# Patient Record
Sex: FEMALE | Race: WHITE | Employment: FULL TIME | URBAN - NONMETROPOLITAN AREA
[De-identification: names, ages, dates, MRNs, and addresses within clinical notes are randomized per-mention and may not be internally consistent; named-entity substitution may affect disease eponyms.]

---

## 2019-01-17 ENCOUNTER — HOSPITAL ENCOUNTER (OUTPATIENT)
Age: 19
Discharge: HOME | End: 2019-01-17
Payer: COMMERCIAL

## 2019-01-17 ENCOUNTER — OFFICE VISIT (OUTPATIENT)
Dept: CARDIOLOGY CLINIC | Age: 19
End: 2019-01-17
Payer: COMMERCIAL

## 2019-01-17 VITALS
HEIGHT: 62 IN | BODY MASS INDEX: 23.6 KG/M2 | WEIGHT: 128.25 LBS | SYSTOLIC BLOOD PRESSURE: 118 MMHG | DIASTOLIC BLOOD PRESSURE: 69 MMHG | HEART RATE: 89 BPM

## 2019-01-17 DIAGNOSIS — R07.89 CHEST PAIN, ATYPICAL: ICD-10-CM

## 2019-01-17 DIAGNOSIS — R00.2 INTERMITTENT PALPITATIONS: Primary | ICD-10-CM

## 2019-01-17 DIAGNOSIS — R00.2 INTERMITTENT PALPITATIONS: ICD-10-CM

## 2019-01-17 DIAGNOSIS — R42 DIZZINESS: ICD-10-CM

## 2019-01-17 LAB
ANION GAP SERPL CALCULATED.3IONS-SCNC: 12 MEQ/L (ref 8–16)
BUN BLDV-MCNC: 10 MG/DL (ref 7–22)
CALCIUM SERPL-MCNC: 8.8 MG/DL (ref 8.5–10.5)
CHLORIDE BLD-SCNC: 102 MEQ/L (ref 98–111)
CO2: 24 MEQ/L (ref 23–33)
CREAT SERPL-MCNC: 0.5 MG/DL (ref 0.4–1.2)
ERYTHROCYTE [DISTWIDTH] IN BLOOD BY AUTOMATED COUNT: 14 % (ref 11.5–14.5)
ERYTHROCYTE [DISTWIDTH] IN BLOOD BY AUTOMATED COUNT: 48.1 FL (ref 35–45)
GFR SERPL CREATININE-BSD FRML MDRD: > 90 ML/MIN/1.73M2
GLUCOSE BLD-MCNC: 92 MG/DL (ref 70–108)
HCT VFR BLD CALC: 33.7 % (ref 37–47)
HEMOGLOBIN: 11 GM/DL (ref 12–16)
MAGNESIUM: 1.8 MG/DL (ref 1.6–2.4)
MCH RBC QN AUTO: 31.2 PG (ref 26–33)
MCHC RBC AUTO-ENTMCNC: 32.6 GM/DL (ref 32.2–35.5)
MCV RBC AUTO: 95.5 FL (ref 81–99)
PLATELET # BLD: 265 THOU/MM3 (ref 130–400)
PMV BLD AUTO: 9.4 FL (ref 9.4–12.4)
POTASSIUM SERPL-SCNC: 4 MEQ/L (ref 3.5–5.2)
RBC # BLD: 3.53 MILL/MM3 (ref 4.2–5.4)
SODIUM BLD-SCNC: 138 MEQ/L (ref 135–145)
T4 FREE: 1.03 NG/DL (ref 0.93–1.76)
TSH SERPL DL<=0.05 MIU/L-ACNC: 6.14 UIU/ML (ref 0.4–4.2)
WBC # BLD: 15.1 THOU/MM3 (ref 4.8–10.8)

## 2019-01-17 PROCEDURE — 84439 ASSAY OF FREE THYROXINE: CPT

## 2019-01-17 PROCEDURE — 85027 COMPLETE CBC AUTOMATED: CPT

## 2019-01-17 PROCEDURE — 84443 ASSAY THYROID STIM HORMONE: CPT

## 2019-01-17 PROCEDURE — 36415 COLL VENOUS BLD VENIPUNCTURE: CPT

## 2019-01-17 PROCEDURE — 80048 BASIC METABOLIC PNL TOTAL CA: CPT

## 2019-01-17 PROCEDURE — 99203 OFFICE O/P NEW LOW 30 MIN: CPT | Performed by: INTERNAL MEDICINE

## 2019-01-17 PROCEDURE — 83735 ASSAY OF MAGNESIUM: CPT

## 2019-01-17 PROCEDURE — 93000 ELECTROCARDIOGRAM COMPLETE: CPT | Performed by: INTERNAL MEDICINE

## 2019-01-21 ENCOUNTER — HOSPITAL ENCOUNTER (OUTPATIENT)
Dept: NON INVASIVE DIAGNOSTICS | Age: 19
Discharge: HOME | End: 2019-01-21
Payer: COMMERCIAL

## 2019-01-21 DIAGNOSIS — R00.2 INTERMITTENT PALPITATIONS: ICD-10-CM

## 2019-01-21 DIAGNOSIS — R07.89 CHEST PAIN, ATYPICAL: ICD-10-CM

## 2019-01-21 DIAGNOSIS — R42 DIZZINESS: ICD-10-CM

## 2019-01-21 LAB
LV EF: 65 %
LVEF MODALITY: NORMAL

## 2019-01-21 PROCEDURE — 93226 XTRNL ECG REC<48 HR SCAN A/R: CPT

## 2019-01-21 PROCEDURE — 93225 XTRNL ECG REC<48 HRS REC: CPT

## 2019-01-21 PROCEDURE — 93306 TTE W/DOPPLER COMPLETE: CPT

## 2019-01-25 LAB
ACQUISITION DURATION: NORMAL S
AVERAGE HEART RATE: 97 BPM
FASTEST SUPRAVENTRICULAR RATE: 85 BPM
HOOKUP DATE: NORMAL
HOOKUP TIME: NORMAL
LONGEST SUPRAVENTRICULAR RATE: 85 BPM
MAX HEART RATE TIME/DATE: NORMAL
MAX HEART RATE: 163 BPM
MIN HEART RATE TIME/DATE: NORMAL
MIN HEART RATE: 47 BPM
NUMBER OF FASTEST SUPRAVENTRICULAR BEATS: 6
NUMBER OF LONGEST SUPRAVENTRICULAR BEATS: 6
NUMBER OF QRS COMPLEXES: NORMAL
NUMBER OF SUPRAVENTRICULAR BEATS IN RUNS: 22
NUMBER OF SUPRAVENTRICULAR COUPLETS: 14
NUMBER OF SUPRAVENTRICULAR ECTOPICS: 60
NUMBER OF SUPRAVENTRICULAR ISOLATED BEATS: 10
NUMBER OF SUPRAVENTRICULAR RUNS: 5
NUMBER OF VENTRICULAR BEATS IN RUNS: 0
NUMBER OF VENTRICULAR BIGEMINAL CYCLES: 0
NUMBER OF VENTRICULAR COUPLETS: 0
NUMBER OF VENTRICULAR ECTOPICS: 0
NUMBER OF VENTRICULAR ISOLATED BEATS: 0
NUMBER OF VENTRICULAR RUNS: 0

## 2019-02-14 ENCOUNTER — OFFICE VISIT (OUTPATIENT)
Dept: CARDIOLOGY CLINIC | Age: 19
End: 2019-02-14
Payer: COMMERCIAL

## 2019-02-14 VITALS
DIASTOLIC BLOOD PRESSURE: 60 MMHG | HEART RATE: 100 BPM | SYSTOLIC BLOOD PRESSURE: 110 MMHG | WEIGHT: 139 LBS | HEIGHT: 62 IN | BODY MASS INDEX: 25.58 KG/M2

## 2019-02-14 DIAGNOSIS — R42 DIZZINESS: ICD-10-CM

## 2019-02-14 DIAGNOSIS — R00.2 INTERMITTENT PALPITATIONS: Primary | ICD-10-CM

## 2019-02-14 PROBLEM — R07.89 CHEST PAIN, ATYPICAL: Status: RESOLVED | Noted: 2019-01-17 | Resolved: 2019-02-14

## 2019-02-14 PROCEDURE — 99213 OFFICE O/P EST LOW 20 MIN: CPT | Performed by: INTERNAL MEDICINE

## 2019-04-11 ENCOUNTER — NURSE ONLY (OUTPATIENT)
Dept: LAB | Age: 19
End: 2019-04-11

## 2019-04-11 LAB
ALT SERPL-CCNC: 9 U/L (ref 11–66)
AST SERPL-CCNC: 19 U/L (ref 5–40)

## 2019-04-17 LAB — BILE ACIDS, FRACTIONATED & TOTAL: NORMAL

## 2019-05-07 ENCOUNTER — HOSPITAL ENCOUNTER (INPATIENT)
Age: 19
LOS: 3 days | Discharge: HOME | End: 2019-05-10
Attending: OBSTETRICS & GYNECOLOGY | Admitting: OBSTETRICS & GYNECOLOGY
Payer: COMMERCIAL

## 2019-05-07 LAB
ABO: NORMAL
AMPHETAMINE+METHAMPHETAMINE URINE SCREEN: NEGATIVE
ANTIBODY SCREEN: NORMAL
BARBITURATE QUANTITATIVE URINE: NEGATIVE
BASOPHILS # BLD: 0.3 %
BASOPHILS ABSOLUTE: 0 THOU/MM3 (ref 0–0.1)
BENZODIAZEPINE QUANTITATIVE URINE: NEGATIVE
CANNABINOID QUANTITATIVE URINE: NEGATIVE
COCAINE METABOLITE QUANTITATIVE URINE: NEGATIVE
EOSINOPHIL # BLD: 0.3 %
EOSINOPHILS ABSOLUTE: 0 THOU/MM3 (ref 0–0.4)
ERYTHROCYTE [DISTWIDTH] IN BLOOD BY AUTOMATED COUNT: 14 % (ref 11.5–14.5)
ERYTHROCYTE [DISTWIDTH] IN BLOOD BY AUTOMATED COUNT: 46.6 FL (ref 35–45)
HCT VFR BLD CALC: 34.3 % (ref 37–47)
HEMOGLOBIN: 11.3 GM/DL (ref 12–16)
IMMATURE GRANS (ABS): 0.17 THOU/MM3 (ref 0–0.07)
IMMATURE GRANULOCYTES: 1.2 %
LYMPHOCYTES # BLD: 16.2 %
LYMPHOCYTES ABSOLUTE: 2.4 THOU/MM3 (ref 1–4.8)
MCH RBC QN AUTO: 30.2 PG (ref 26–33)
MCHC RBC AUTO-ENTMCNC: 32.9 GM/DL (ref 32.2–35.5)
MCV RBC AUTO: 91.7 FL (ref 81–99)
MONOCYTES # BLD: 7.3 %
MONOCYTES ABSOLUTE: 1.1 THOU/MM3 (ref 0.4–1.3)
NUCLEATED RED BLOOD CELLS: 0 /100 WBC
OPIATES, URINE: NEGATIVE
OXYCODONE: NEGATIVE
PHENCYCLIDINE QUANTITATIVE URINE: NEGATIVE
PLATELET # BLD: 224 THOU/MM3 (ref 130–400)
PMV BLD AUTO: 10.3 FL (ref 9.4–12.4)
RBC # BLD: 3.74 MILL/MM3 (ref 4.2–5.4)
RH FACTOR: NORMAL
SEG NEUTROPHILS: 74.7 %
SEGMENTED NEUTROPHILS ABSOLUTE COUNT: 10.9 THOU/MM3 (ref 1.8–7.7)
WBC # BLD: 14.6 THOU/MM3 (ref 4.8–10.8)

## 2019-05-07 PROCEDURE — 86850 RBC ANTIBODY SCREEN: CPT

## 2019-05-07 PROCEDURE — 85025 COMPLETE CBC W/AUTO DIFF WBC: CPT

## 2019-05-07 PROCEDURE — 80307 DRUG TEST PRSMV CHEM ANLYZR: CPT

## 2019-05-07 PROCEDURE — 86592 SYPHILIS TEST NON-TREP QUAL: CPT

## 2019-05-07 PROCEDURE — 86900 BLOOD TYPING SEROLOGIC ABO: CPT

## 2019-05-07 PROCEDURE — 2709999900 HC NON-CHARGEABLE SUPPLY

## 2019-05-07 PROCEDURE — 1220000001 HC SEMI PRIVATE L&D R&B

## 2019-05-07 PROCEDURE — 86901 BLOOD TYPING SEROLOGIC RH(D): CPT

## 2019-05-07 PROCEDURE — 36415 COLL VENOUS BLD VENIPUNCTURE: CPT

## 2019-05-07 PROCEDURE — 2580000003 HC RX 258: Performed by: OBSTETRICS & GYNECOLOGY

## 2019-05-07 PROCEDURE — 6360000002 HC RX W HCPCS

## 2019-05-07 RX ORDER — DIPHENHYDRAMINE HYDROCHLORIDE 50 MG/ML
25 INJECTION INTRAMUSCULAR; INTRAVENOUS EVERY 4 HOURS PRN
Status: DISCONTINUED | OUTPATIENT
Start: 2019-05-07 | End: 2019-05-08 | Stop reason: HOSPADM

## 2019-05-07 RX ORDER — LIDOCAINE HYDROCHLORIDE 10 MG/ML
30 INJECTION, SOLUTION EPIDURAL; INFILTRATION; INTRACAUDAL; PERINEURAL PRN
Status: DISCONTINUED | OUTPATIENT
Start: 2019-05-07 | End: 2019-05-08 | Stop reason: HOSPADM

## 2019-05-07 RX ORDER — MORPHINE SULFATE 2 MG/ML
2 INJECTION, SOLUTION INTRAMUSCULAR; INTRAVENOUS
Status: DISCONTINUED | OUTPATIENT
Start: 2019-05-07 | End: 2019-05-08 | Stop reason: HOSPADM

## 2019-05-07 RX ORDER — MISOPROSTOL 200 UG/1
1000 TABLET ORAL PRN
Status: DISCONTINUED | OUTPATIENT
Start: 2019-05-07 | End: 2019-05-08 | Stop reason: HOSPADM

## 2019-05-07 RX ORDER — IBUPROFEN 800 MG/1
800 TABLET ORAL EVERY 8 HOURS PRN
Status: DISCONTINUED | OUTPATIENT
Start: 2019-05-07 | End: 2019-05-08 | Stop reason: HOSPADM

## 2019-05-07 RX ORDER — CARBOPROST TROMETHAMINE 250 UG/ML
250 INJECTION, SOLUTION INTRAMUSCULAR PRN
Status: DISCONTINUED | OUTPATIENT
Start: 2019-05-07 | End: 2019-05-08

## 2019-05-07 RX ORDER — MORPHINE SULFATE 4 MG/ML
4 INJECTION, SOLUTION INTRAMUSCULAR; INTRAVENOUS
Status: DISCONTINUED | OUTPATIENT
Start: 2019-05-07 | End: 2019-05-08 | Stop reason: HOSPADM

## 2019-05-07 RX ORDER — METHYLERGONOVINE MALEATE 0.2 MG/ML
200 INJECTION INTRAVENOUS PRN
Status: DISCONTINUED | OUTPATIENT
Start: 2019-05-07 | End: 2019-05-08 | Stop reason: HOSPADM

## 2019-05-07 RX ORDER — ONDANSETRON 2 MG/ML
8 INJECTION INTRAMUSCULAR; INTRAVENOUS EVERY 6 HOURS PRN
Status: DISCONTINUED | OUTPATIENT
Start: 2019-05-07 | End: 2019-05-08 | Stop reason: HOSPADM

## 2019-05-07 RX ORDER — BUTORPHANOL TARTRATE 1 MG/ML
1 INJECTION, SOLUTION INTRAMUSCULAR; INTRAVENOUS
Status: DISCONTINUED | OUTPATIENT
Start: 2019-05-07 | End: 2019-05-08 | Stop reason: HOSPADM

## 2019-05-07 RX ORDER — ACETAMINOPHEN 325 MG/1
650 TABLET ORAL EVERY 4 HOURS PRN
Status: DISCONTINUED | OUTPATIENT
Start: 2019-05-07 | End: 2019-05-08 | Stop reason: HOSPADM

## 2019-05-07 RX ORDER — SODIUM CHLORIDE, SODIUM LACTATE, POTASSIUM CHLORIDE, CALCIUM CHLORIDE 600; 310; 30; 20 MG/100ML; MG/100ML; MG/100ML; MG/100ML
INJECTION, SOLUTION INTRAVENOUS CONTINUOUS
Status: DISCONTINUED | OUTPATIENT
Start: 2019-05-07 | End: 2019-05-08

## 2019-05-07 RX ORDER — FERROUS SULFATE 325(65) MG
325 TABLET ORAL DAILY
Status: ON HOLD | COMMUNITY
End: 2019-05-10 | Stop reason: SDUPTHER

## 2019-05-07 RX ORDER — SEVOFLURANE 250 ML/250ML
1 LIQUID RESPIRATORY (INHALATION) CONTINUOUS PRN
Status: DISCONTINUED | OUTPATIENT
Start: 2019-05-07 | End: 2019-05-08 | Stop reason: HOSPADM

## 2019-05-07 RX ORDER — TERBUTALINE SULFATE 1 MG/ML
0.25 INJECTION, SOLUTION SUBCUTANEOUS ONCE
Status: DISCONTINUED | OUTPATIENT
Start: 2019-05-07 | End: 2019-05-08 | Stop reason: HOSPADM

## 2019-05-07 RX ADMIN — SODIUM CHLORIDE, POTASSIUM CHLORIDE, SODIUM LACTATE AND CALCIUM CHLORIDE: 600; 310; 30; 20 INJECTION, SOLUTION INTRAVENOUS at 19:20

## 2019-05-07 RX ADMIN — Medication 1 MILLI-UNITS/MIN: at 20:29

## 2019-05-07 ASSESSMENT — PAIN DESCRIPTION - DESCRIPTORS: DESCRIPTORS: CRAMPING

## 2019-05-07 NOTE — FLOWSHEET NOTE
Dr Camilo Signs notified of pt's arrival. States she'll be in in a couple of hours to place the lynch. Orders placed.

## 2019-05-07 NOTE — PLAN OF CARE
Problem: Anxiety:  Goal: Level of anxiety will decrease  Description  Level of anxiety will decrease  Outcome: Ongoing  Note:   Pt remains calm about the birthing experience, boyfriend at bedside, supportive. All questions/concerns addressed by RN. Problem: Breathing Pattern - Ineffective:  Goal: Able to breathe comfortably  Description  Able to breathe comfortably  Outcome: Ongoing  Note:   No signs of resp distress noted. Sp02 remains greater than 92% on room air. Respirations equal and unlabored. Problem: Fluid Volume - Imbalance:  Goal: Absence of intrapartum hemorrhage signs and symptoms  Description  Absence of intrapartum hemorrhage signs and symptoms  Outcome: Ongoing  Note:   No vaginal bleeding noted, will continue to monitor. Problem: Infection - Intrapartum Infection:  Goal: Will show no infection signs and symptoms  Description  Will show no infection signs and symptoms  Outcome: Ongoing  Note:   Vitals stable, pt remains afebrile. GBS negative. FHT's remain reassuring, will continue to monitor. Problem: Labor Process - Prolonged:  Goal: Uterine contractions within specified parameters  Description  Uterine contractions within specified parameters  Outcome: Ongoing  Note:   Pt having irregular, mild contractions. Problem: Pain - Acute:  Goal: Able to cope with pain  Description  Able to cope with pain  Outcome: Ongoing  Note:   Pt denies any pain at this time but is planning on an epidural as labor progresses. Pt decided on a pain goal of 7/10 during her stay. Problem: Tissue Perfusion - Uteroplacental, Altered:  Goal: Absence of abnormal fetal heart rate pattern  Description  Absence of abnormal fetal heart rate pattern  Outcome: Ongoing  Note:   Fetal Heart Tones remain reassuring. Continuous EFM in place.         Problem: Urinary Retention:  Goal: Urinary elimination within specified parameters  Description  Urinary elimination within specified parameters  Outcome:

## 2019-05-07 NOTE — PLAN OF CARE
Problem: Anxiety:  Goal: Level of anxiety will decrease  Description  Level of anxiety will decrease  5/7/2019 1954 by Itzel Hutchins RN  Outcome: Ongoing  Note:   Patient is slightly anxious and but cooperative, FOB at bedside at supportive, RN offering positive reassurance   5/7/2019 1921 by Victor Manuel Galeana RN  Outcome: Ongoing  Note:   Pt remains calm about the birthing experience, boyfriend at bedside, supportive. All questions/concerns addressed by RN. Problem: Breathing Pattern - Ineffective:  Goal: Able to breathe comfortably  Description  Able to breathe comfortably  5/7/2019 1954 by Itzel Hutchins RN  Outcome: Ongoing  Note:   Easy and unlabored respirations, denies SOB   5/7/2019 1921 by Victor Manuel Galeana RN  Outcome: Ongoing  Note:   No signs of resp distress noted. Sp02 remains greater than 92% on room air. Respirations equal and unlabored. Problem: Fluid Volume - Imbalance:  Goal: Absence of intrapartum hemorrhage signs and symptoms  Description  Absence of intrapartum hemorrhage signs and symptoms  5/7/2019 1954 by Itzel Hutchins RN  Outcome: Ongoing  Note:   No vaginal bleeding noted, will continue to monitor   5/7/2019 1921 by Victor Manuel Galeana RN  Outcome: Ongoing  Note:   No vaginal bleeding noted, will continue to monitor. Problem: Infection - Intrapartum Infection:  Goal: Will show no infection signs and symptoms  Description  Will show no infection signs and symptoms  5/7/2019 1954 by Itzel Hutchins RN  Outcome: Ongoing  Note:   Vitals are stable, afebrile, GBS negative   5/7/2019 1921 by Victor Manuel Galeana RN  Outcome: Ongoing  Note:   Vitals stable, pt remains afebrile. GBS negative. FHT's remain reassuring, will continue to monitor.        Problem: Labor Process - Prolonged:  Goal: Uterine contractions within specified parameters  Description  Uterine contractions within specified parameters  5/7/2019 1954 by Itzel Hutchins RN  Outcome: Ongoing  Note:   [place] monitor in place tracing contraction pattern   5/7/2019 1921 by Rafia Arnett RN  Outcome: Ongoing  Note:   Pt having irregular, mild contractions. Problem: Pain - Acute:  Goal: Able to cope with pain  Description  Able to cope with pain  5/7/2019 1954 by Shanika Felix RN  Outcome: Ongoing  Note:   Denies currently, planning on labor epidural, pain goal 7/10  5/7/2019 1921 by Rafia Arnett RN  Outcome: Ongoing  Note:   Pt denies any pain at this time but is planning on an epidural as labor progresses. Pt decided on a pain goal of 7/10 during her stay. Problem: Tissue Perfusion - Uteroplacental, Altered:  Goal: Absence of abnormal fetal heart rate pattern  Description  Absence of abnormal fetal heart rate pattern  5/7/2019 1954 by Shanika Felix RN  Outcome: Ongoing  Note:   External US in place tracing reactive FHTs   5/7/2019 1921 by Rafia Arnett RN  Outcome: Ongoing  Note:   Fetal Heart Tones remain reassuring. Continuous EFM in place. Problem: Urinary Retention:  Goal: Urinary elimination within specified parameters  Description  Urinary elimination within specified parameters  5/7/2019 1954 by Shanika Felix RN  Outcome: Ongoing  Note:   Patient voiding with ease, quantities sufficient   5/7/2019 1921 by Rafia Arnett RN  Outcome: Ongoing  Note:   Pt ambulating to bathroom as needed. Problem: Falls - Risk of:  Goal: Will remain free from falls  Description  Will remain free from falls  5/7/2019 1954 by Shanika Felix RN  Outcome: Ongoing  Note:   Aware to call out for assistance with ambulation, bed locked in lowest position, call light within reach   5/7/2019 1921 by Rafia Arnett RN  Outcome: Ongoing  Note:   Pt. Remains free from falls at this time. IV infusing per order. RN encouraged pt. To call for assistance to BR. Side rails up X2. Call light within reach. S.O. At bedside. RN will continue to provide for a safe environment.        Problem: Discharge Planning:  Goal: Discharged to appropriate level of care  Description  Discharged to appropriate level of care  5/7/2019 1954 by Anny Alejandra, RN  Outcome: Ongoing  Note:   Aware of 2 hour recovery period on L&D then transfer to mom/baby unit for remainder of stay   5/7/2019 1921 by Asia Yee RN  Outcome: Ongoing  Note:   Pt aware of 2hr recovery period in L&D and then transfer to mom/baby for the remainder of her stay. Care plan reviewed with patient and FOB.   Patient and FOB verbalize understanding of the plan of care and contribute to goal setting.  '

## 2019-05-08 ENCOUNTER — ANESTHESIA EVENT (OUTPATIENT)
Dept: LABOR AND DELIVERY | Age: 19
End: 2019-05-08
Payer: COMMERCIAL

## 2019-05-08 ENCOUNTER — ANESTHESIA (OUTPATIENT)
Dept: LABOR AND DELIVERY | Age: 19
End: 2019-05-08
Payer: COMMERCIAL

## 2019-05-08 LAB — RPR: NONREACTIVE

## 2019-05-08 PROCEDURE — 2580000003 HC RX 258: Performed by: OBSTETRICS & GYNECOLOGY

## 2019-05-08 PROCEDURE — 0KQM0ZZ REPAIR PERINEUM MUSCLE, OPEN APPROACH: ICD-10-PCS | Performed by: OBSTETRICS & GYNECOLOGY

## 2019-05-08 PROCEDURE — 0UQMXZZ REPAIR VULVA, EXTERNAL APPROACH: ICD-10-PCS | Performed by: OBSTETRICS & GYNECOLOGY

## 2019-05-08 PROCEDURE — 7200000001 HC VAGINAL DELIVERY

## 2019-05-08 PROCEDURE — 1220000000 HC SEMI PRIVATE OB R&B

## 2019-05-08 PROCEDURE — 10907ZC DRAINAGE OF AMNIOTIC FLUID, THERAPEUTIC FROM PRODUCTS OF CONCEPTION, VIA NATURAL OR ARTIFICIAL OPENING: ICD-10-PCS | Performed by: OBSTETRICS & GYNECOLOGY

## 2019-05-08 PROCEDURE — 6360000002 HC RX W HCPCS: Performed by: OBSTETRICS & GYNECOLOGY

## 2019-05-08 PROCEDURE — 2709999900 HC NON-CHARGEABLE SUPPLY

## 2019-05-08 PROCEDURE — 6370000000 HC RX 637 (ALT 250 FOR IP): Performed by: OBSTETRICS & GYNECOLOGY

## 2019-05-08 PROCEDURE — 3E033VJ INTRODUCTION OF OTHER HORMONE INTO PERIPHERAL VEIN, PERCUTANEOUS APPROACH: ICD-10-PCS | Performed by: OBSTETRICS & GYNECOLOGY

## 2019-05-08 PROCEDURE — 0UQGXZZ REPAIR VAGINA, EXTERNAL APPROACH: ICD-10-PCS | Performed by: OBSTETRICS & GYNECOLOGY

## 2019-05-08 PROCEDURE — 2500000003 HC RX 250 WO HCPCS: Performed by: ANESTHESIOLOGY

## 2019-05-08 PROCEDURE — 4A1HXCZ MONITORING OF PRODUCTS OF CONCEPTION, CARDIAC RATE, EXTERNAL APPROACH: ICD-10-PCS | Performed by: OBSTETRICS & GYNECOLOGY

## 2019-05-08 PROCEDURE — 3700000025 EPIDURAL BLOCK: Performed by: ANESTHESIOLOGY

## 2019-05-08 RX ORDER — MORPHINE SULFATE 4 MG/ML
4 INJECTION, SOLUTION INTRAMUSCULAR; INTRAVENOUS
Status: DISCONTINUED | OUTPATIENT
Start: 2019-05-08 | End: 2019-05-10 | Stop reason: HOSPADM

## 2019-05-08 RX ORDER — ONDANSETRON 2 MG/ML
4 INJECTION INTRAMUSCULAR; INTRAVENOUS EVERY 6 HOURS PRN
Status: DISCONTINUED | OUTPATIENT
Start: 2019-05-08 | End: 2019-05-10 | Stop reason: HOSPADM

## 2019-05-08 RX ORDER — METHYLERGONOVINE MALEATE 0.2 MG/ML
200 INJECTION INTRAVENOUS
Status: ACTIVE | OUTPATIENT
Start: 2019-05-08 | End: 2019-05-08

## 2019-05-08 RX ORDER — MORPHINE SULFATE 4 MG/ML
2 INJECTION, SOLUTION INTRAMUSCULAR; INTRAVENOUS
Status: DISCONTINUED | OUTPATIENT
Start: 2019-05-08 | End: 2019-05-10 | Stop reason: HOSPADM

## 2019-05-08 RX ORDER — LANOLIN 100 %
OINTMENT (GRAM) TOPICAL PRN
Status: DISCONTINUED | OUTPATIENT
Start: 2019-05-08 | End: 2019-05-10 | Stop reason: HOSPADM

## 2019-05-08 RX ORDER — HYDROCODONE BITARTRATE AND ACETAMINOPHEN 5; 325 MG/1; MG/1
2 TABLET ORAL EVERY 4 HOURS PRN
Status: DISCONTINUED | OUTPATIENT
Start: 2019-05-08 | End: 2019-05-10 | Stop reason: HOSPADM

## 2019-05-08 RX ORDER — HYDROCODONE BITARTRATE AND ACETAMINOPHEN 5; 325 MG/1; MG/1
1 TABLET ORAL EVERY 4 HOURS PRN
Status: DISCONTINUED | OUTPATIENT
Start: 2019-05-08 | End: 2019-05-10 | Stop reason: HOSPADM

## 2019-05-08 RX ORDER — ACETAMINOPHEN 325 MG/1
650 TABLET ORAL EVERY 4 HOURS PRN
Status: DISCONTINUED | OUTPATIENT
Start: 2019-05-08 | End: 2019-05-10 | Stop reason: HOSPADM

## 2019-05-08 RX ORDER — SODIUM CHLORIDE 0.9 % (FLUSH) 0.9 %
10 SYRINGE (ML) INJECTION EVERY 12 HOURS SCHEDULED
Status: DISCONTINUED | OUTPATIENT
Start: 2019-05-08 | End: 2019-05-10 | Stop reason: HOSPADM

## 2019-05-08 RX ORDER — DOCUSATE SODIUM 100 MG/1
100 CAPSULE, LIQUID FILLED ORAL 2 TIMES DAILY PRN
Status: DISCONTINUED | OUTPATIENT
Start: 2019-05-08 | End: 2019-05-10 | Stop reason: HOSPADM

## 2019-05-08 RX ORDER — SODIUM CHLORIDE, SODIUM LACTATE, POTASSIUM CHLORIDE, CALCIUM CHLORIDE 600; 310; 30; 20 MG/100ML; MG/100ML; MG/100ML; MG/100ML
INJECTION, SOLUTION INTRAVENOUS CONTINUOUS
Status: DISCONTINUED | OUTPATIENT
Start: 2019-05-08 | End: 2019-05-10 | Stop reason: HOSPADM

## 2019-05-08 RX ORDER — FERROUS SULFATE 325(65) MG
325 TABLET ORAL
Status: DISCONTINUED | OUTPATIENT
Start: 2019-05-09 | End: 2019-05-10 | Stop reason: HOSPADM

## 2019-05-08 RX ORDER — IBUPROFEN 800 MG/1
800 TABLET ORAL 3 TIMES DAILY
Status: DISCONTINUED | OUTPATIENT
Start: 2019-05-08 | End: 2019-05-10 | Stop reason: HOSPADM

## 2019-05-08 RX ORDER — CARBOPROST TROMETHAMINE 250 UG/ML
250 INJECTION, SOLUTION INTRAMUSCULAR
Status: DISPENSED | OUTPATIENT
Start: 2019-05-08 | End: 2019-05-08

## 2019-05-08 RX ORDER — MISOPROSTOL 200 UG/1
800 TABLET ORAL
Status: ACTIVE | OUTPATIENT
Start: 2019-05-08 | End: 2019-05-08

## 2019-05-08 RX ORDER — ONDANSETRON 4 MG/1
8 TABLET, FILM COATED ORAL EVERY 8 HOURS PRN
Status: DISCONTINUED | OUTPATIENT
Start: 2019-05-08 | End: 2019-05-10 | Stop reason: HOSPADM

## 2019-05-08 RX ORDER — EPHEDRINE SULFATE 50 MG/ML
5 INJECTION, SOLUTION INTRAVENOUS PRN
Status: DISCONTINUED | OUTPATIENT
Start: 2019-05-08 | End: 2019-05-08

## 2019-05-08 RX ORDER — SODIUM CHLORIDE 0.9 % (FLUSH) 0.9 %
10 SYRINGE (ML) INJECTION PRN
Status: DISCONTINUED | OUTPATIENT
Start: 2019-05-08 | End: 2019-05-10 | Stop reason: HOSPADM

## 2019-05-08 RX ADMIN — IBUPROFEN 800 MG: 800 TABLET ORAL at 20:41

## 2019-05-08 RX ADMIN — IBUPROFEN 800 MG: 800 TABLET ORAL at 12:35

## 2019-05-08 RX ADMIN — HYDROCODONE BITARTRATE AND ACETAMINOPHEN 1 TABLET: 5; 325 TABLET ORAL at 23:05

## 2019-05-08 RX ADMIN — SODIUM CHLORIDE, POTASSIUM CHLORIDE, SODIUM LACTATE AND CALCIUM CHLORIDE: 600; 310; 30; 20 INJECTION, SOLUTION INTRAVENOUS at 03:49

## 2019-05-08 RX ADMIN — DOCUSATE SODIUM 100 MG: 100 CAPSULE, LIQUID FILLED ORAL at 20:41

## 2019-05-08 RX ADMIN — Medication 18 ML/HR: at 00:45

## 2019-05-08 RX ADMIN — ONDANSETRON 8 MG: 2 INJECTION INTRAMUSCULAR; INTRAVENOUS at 08:15

## 2019-05-08 RX ADMIN — HYDROCODONE BITARTRATE AND ACETAMINOPHEN 2 TABLET: 5; 325 TABLET ORAL at 13:46

## 2019-05-08 RX ADMIN — HYDROCODONE BITARTRATE AND ACETAMINOPHEN 2 TABLET: 5; 325 TABLET ORAL at 18:10

## 2019-05-08 RX ADMIN — SODIUM CHLORIDE, POTASSIUM CHLORIDE, SODIUM LACTATE AND CALCIUM CHLORIDE: 600; 310; 30; 20 INJECTION, SOLUTION INTRAVENOUS at 00:17

## 2019-05-08 ASSESSMENT — PAIN SCALES - GENERAL
PAINLEVEL_OUTOF10: 4
PAINLEVEL_OUTOF10: 9
PAINLEVEL_OUTOF10: 7

## 2019-05-08 ASSESSMENT — PAIN DESCRIPTION - DESCRIPTORS: DESCRIPTORS: CRAMPING

## 2019-05-08 NOTE — FLOWSHEET NOTE
Dr Kyra Degroot returned page to unit, informed patient is requesting labor epidural. Platelets are 310. States he is en route.

## 2019-05-08 NOTE — FLOWSHEET NOTE
Tension applied to lynch,and re taped to leg. Patient tolerated well. Patient denies any additional needs at this time.

## 2019-05-08 NOTE — FLOWSHEET NOTE
Pt assisted to the bathroom. Voided large amount. Denice care done. Fundus firm and at umbilicus. Pt denies other needs at this time.

## 2019-05-08 NOTE — FLOWSHEET NOTE
Pt assisted to the bathroom. Voided large amount. Denice care done. Small amount of vaginal bleeding. Pt complained of being lightheaded arlet steady used to assist patient back to bed. Pt states feeling much better. Fundus firm and at umbilicus.

## 2019-05-08 NOTE — H&P
[address] [address]  History and Physical Update    Pt Name: [place]  MRN: [id number]  [place]: 2000  Date of evaluation: 5/7/2019    [] I have examined the patient and reviewed the H&P/Consult and there are no changes to the patient or plans. [x] I have examined the patient and reviewed the H&P/Consult and have noted the following changes: 18 yo G1 @ 39+3 wks presents for IOL. She had decreased fetal movement. GBS neg. Sve: 1/70/-3 lynch bulb placed with 60 cc of sterile saline. Risks, benefits and alternatives of lynch induction of labor reviewed and patient agrees. FHTs:  130, mod faby, +accels, no decels toco: occasional category I tracing. Reactive.       Heather Leigh MD  Electronically signed 5/7/2019 at 8:20 PM

## 2019-05-08 NOTE — ANESTHESIA PROCEDURE NOTES
Epidural Block    Patient location during procedure: OB  Reason for block: labor epidural  Staffing  Anesthesiologist: Jacinto Sher DO  Performed: anesthesiologist   Preanesthetic Checklist  Completed: patient identified, site marked, surgical consent, pre-op evaluation, timeout performed, IV checked, risks and benefits discussed, monitors and equipment checked, anesthesia consent given, oxygen available and patient being monitored  Epidural  Patient position: sitting  Prep: ChloraPrep and site prepped and draped  Patient monitoring: continuous pulse ox and frequent blood pressure checks  Approach: midline  Location: lumbar (1-5)  Injection technique: DAVEY saline  Provider prep: mask and sterile gloves  Needle  Needle type: Tuohy   Needle gauge: 18 G  Needle length: 3.5 in  Needle insertion depth: 5.5 cm  Catheter type: side hole  Catheter at skin depth: 12 cm  Test dose: negative  Assessment  Hemodynamics: stable  Attempts: 1

## 2019-05-08 NOTE — FLOWSHEET NOTE
Pt assisted to the bathroom. Pt voided large amount. Denice care done. Small amount of rubra lochia draining. Fundus firm and at umbilicus. Pt denies other needs at this time.

## 2019-05-08 NOTE — PROGRESS NOTES
S: comfortable with epidurla    O:  Vitals:    05/08/19 0800   BP: (!) 98/55   Pulse: (!) 47   Resp:    Temp:    SpO2:      Sve: 4-5/70/-3, AROM for clear fluid    FHTs: 120, mod faby, +accels, no decels  [place]: q2-3 min. Category I tracing. Reactive. A: 18 yo G1 @ 39+3 wks IOL  P:  1. con't pitocin  2. Internals prn. 3. con't position changes  4. con't to work towards delivery.      Shadi Leigh  8:12 AM  5/8/2019

## 2019-05-08 NOTE — L&D DELIVERY NOTE
Department of Obstetrics and Gynecology   Vaginal Delivery Note at [facility]      Date of Delivery:  2019    Procedure:  Spontaneous vaginal delivery and Repair second degree perineal, right labia, left vaginal wall and left labial spontaneous lacerations    Surgeon:  Niya Leigh    Anesthesia:  epidural anesthesia    Estimated blood loss:  800ml    Cord blood sent Yes    Complications:  Post partum hemorrhage due to lacerations    Condition:  infant stable to general nursery and mother stable    Details of Procedure: The patient is a 19 y.o.  female at 43w3d  who was admitted for IOL. She received the following interventions: ARBOW, IV Pitocin induction and lynch bulb cervical ripening. The patient progressed well,did receive an epidural, became complete and started to push. Patient progressed well and the fetal head was delivered over an intact perineum, nose and mouth suctioned with bulb suction and the rest of the infant delivered atraumatically, placed on mother abdomen. The cord was clamped and cut and infant handed off to the waiting nurse for evaluation. The delivery of the placenta was spontaneous. The perineum and vagina were explored and a Repair second degree perineal, right labia, left vaginal wall and left labial spontaneous lacerations were repaired in standard fashion 3-0 vicryl. A vaginal sweep was completed and five sharps were placed in the proper container. Sponge counts correct.     Infant Wt: pending    APGARS:  pending  Fernando Childress [[id number]]             Electronically signed by Te Gonsalves MD on 2019 at 11:30 AM

## 2019-05-08 NOTE — PLAN OF CARE
Problem: Anxiety:  Goal: Level of anxiety will decrease  Description  Level of anxiety will decrease  5/8/2019 0848 by Jayda Marquez RN  Note:   Pt denies anxiety  5/7/2019 1954 by Gil Can RN  Outcome: Ongoing  Note:   Patient is slightly anxious and but cooperative, FOB at bedside at supportive, RN offering positive reassurance   5/7/2019 1921 by Nas Boone RN  Outcome: Ongoing  Note:   Pt remains calm about the birthing experience, boyfriend at bedside, supportive. All questions/concerns addressed by RN. Problem: Breathing Pattern - Ineffective:  Goal: Able to breathe comfortably  Description  Able to breathe comfortably  5/8/2019 0848 by Jayda Marquez RN  Note:   RR 18 clear and easy  5/7/2019 1954 by Gil Can RN  Outcome: Ongoing  Note:   Easy and unlabored respirations, denies SOB   5/7/2019 1921 by Nas Boone RN  Outcome: Ongoing  Note:   No signs of resp distress noted. Sp02 remains greater than 92% on room air. Respirations equal and unlabored. Problem: Fluid Volume - Imbalance:  Goal: Absence of intrapartum hemorrhage signs and symptoms  Description  Absence of intrapartum hemorrhage signs and symptoms  5/8/2019 0848 by Jayda Marquez RN  Note:   No vaginal bleeding noted  5/7/2019 1954 by Gil Can RN  Outcome: Ongoing  Note:   No vaginal bleeding noted, will continue to monitor   5/7/2019 1921 by Nas Boone RN  Outcome: Ongoing  Note:   No vaginal bleeding noted, will continue to monitor. Problem: Infection - Intrapartum Infection:  Goal: Will show no infection signs and symptoms  Description  Will show no infection signs and symptoms  5/8/2019 0848 by Jayda Marquez RN  Note:   Pt afebrile   5/7/2019 1954 by Gil Can RN  Outcome: Ongoing  Note:   Vitals are stable, afebrile, GBS negative   5/7/2019 1921 by Nas Boone RN  Outcome: Ongoing  Note:   Vitals stable, pt remains afebrile. GBS negative.  FHT's remain reassuring, will continue to monitor. Problem: Labor Process - Prolonged:  Goal: Uterine contractions within specified parameters  Description  Uterine contractions within specified parameters  5/8/2019 0848 by Henna De La Garza RN  Note:   Pitocin titrated to ctx every 2-3 minutes  5/7/2019 1954 by Adrienne Abdi RN  Outcome: Ongoing  Note:   [place] monitor in place tracing contraction pattern   5/7/2019 1921 by Sandra Thomas RN  Outcome: Ongoing  Note:   Pt having irregular, mild contractions. Problem: Pain - Acute:  Goal: Able to cope with pain  Description  Able to cope with pain  5/8/2019 0848 by Henna De La Garza RN  Note:   Pt comfortable with epidural  5/7/2019 1954 by Adrienne Abdi RN  Outcome: Ongoing  Note:   Denies currently, planning on labor epidural, pain goal 7/10  5/7/2019 1921 by Sandra Thomas RN  Outcome: Ongoing  Note:   Pt denies any pain at this time but is planning on an epidural as labor progresses. Pt decided on a pain goal of 7/10 during her stay. Problem: Tissue Perfusion - Uteroplacental, Altered:  Goal: Absence of abnormal fetal heart rate pattern  Description  Absence of abnormal fetal heart rate pattern  5/8/2019 0848 by Henna De La Garza RN  Note:   FHT's reactive  5/7/2019 1954 by Adrienne Abdi RN  Outcome: Ongoing  Note:   External US in place tracing reactive FHTs   5/7/2019 1921 by Sandra Thomas RN  Outcome: Ongoing  Note:   Fetal Heart Tones remain reassuring. Continuous EFM in place. Problem: Urinary Retention:  Goal: Urinary elimination within specified parameters  Description  Urinary elimination within specified parameters  5/8/2019 0848 by Henna De La Garza RN  Note:   [place] in place and draining clear urine  5/7/2019 1954 by Adrienne Abdi RN  Outcome: Ongoing  Note:   Patient voiding with ease, quantities sufficient   5/7/2019 1921 by Sandra Thomas RN  Outcome: Ongoing  Note:   Pt ambulating to bathroom as needed.       Problem: Falls - Risk of:  Goal: Will remain free from falls  Description  Will remain free from falls  5/8/2019 0848 by Kiara Pearson RN  Note:   Pt aware not to get out of bed without assistance  5/7/2019 1954 by Ryan Luna RN  Outcome: Ongoing  Note:   Aware to call out for assistance with ambulation, bed locked in lowest position, call light within reach   5/7/2019 1921 by Noe Feng RN  Outcome: Ongoing  Note:   Pt. Remains free from falls at this time. IV infusing per order. RN encouraged pt. To call for assistance to BR. Side rails up X2. Call light within reach. S.O. At bedside. RN will continue to provide for a safe environment. Problem: Discharge Planning:  Goal: Discharged to appropriate level of care  Description  Discharged to appropriate level of care  5/8/2019 0848 by Kiara Pearson RN  Note:   Pt plans discharge to home after delivery  5/7/2019 1954 by Ryan Luna RN  Outcome: Ongoing  Note:   Aware of 2 hour recovery period on L&D then transfer to mom/baby unit for remainder of stay   5/7/2019 1921 by Noe Feng RN  Outcome: Ongoing  Note:   Pt aware of 2hr recovery period in L&D and then transfer to mom/baby for the remainder of her stay. Care plan reviewed with patient. Patient verbalize understanding of the plan of care and contribute to goal setting.

## 2019-05-08 NOTE — FLOWSHEET NOTE
Dr Lj Robert returned page to unit, updated patients cris came out at [phone]. Received an epidural at 0055 and has been comfortable all night. FHTs reactive, contractions every 2-3 minutes, Pitocin infusing at 4mu/min. VE 6/90/-1 with intact membranes. Continue with current plan of care.

## 2019-05-08 NOTE — ANESTHESIA PRE PROCEDURE
Department of Anesthesiology  Preprocedure Note       Name:  Chuy Fountain   Age:  19 y.o.  :  2000                                          MRN:  [id number]         Date:  2019      Surgeon: * No surgeons listed *    Procedure: Labor Analgesia    Medications prior to admission:   Prior to Admission medications    Medication Sig Start Date End Date Taking?  Authorizing Provider   ferrous sulfate 325 (65 Fe) MG tablet Take 325 mg by mouth daily   Yes Historical Provider, MD   Prenatal Multivit-Min-Fe-FA (PRENATAL VITAMINS PO) Take by mouth daily   Yes Historical Provider, MD       Current medications:    Current Facility-Administered Medications   Medication Dose Route Frequency Provider Last Rate Last Dose    ePHEDrine injection 5 mg  5 mg Intravenous PRN Díaz Light, DO        fentaNYL 750 mcg, ropivacaine 0.1% in sodium chloride 0.9% 265mL (OB) epidural  18 mL/hr Epidural Continuous Díaz Light, DO 18 mL/hr at 19 0045 18 mL/hr at 19 0045    lactated ringers infusion   Intravenous Continuous Mayito Edd Leigh  mL/hr at 19 0017      lidocaine PF 1 % injection 30 mL  30 mL Other PRN Bailey Milligan MD        butorphanol (STADOL) injection 1 mg  1 mg Intravenous Q1H PRN Bailey Milligan MD        nitrous oxide 50% inhalation 1 each  1 each Inhalation Continuous PRN Bailey Milligan MD        acetaminophen (TYLENOL) tablet 650 mg  650 mg Oral Q4H PRN Bailey Milligan MD        ibuprofen (ADVIL;MOTRIN) tablet 800 mg  800 mg Oral Q8H PRN Bailey Milligan MD        morphine (PF) injection 2 mg  2 mg Intravenous Q2H PRN Bailey Milligan MD        Or    morphine injection 4 mg  4 mg Intravenous Q2H PRN Bailey Milligan MD        oxytocin (PITOCIN) 30 units in 500 mL infusion  1 avelino-units/min Intravenous Continuous Bailey Milligan MD 1 mL/hr at 19 1 avelino-units/min at 19    diphenhydrAMINE (BENADRYL) injection 25 mg  25 mg Intravenous Q4H PRN Shamar Caldwell MD        ondansetron [facility]) injection 8 mg  8 mg Intravenous Q6H PRN Shamar Caldwell MD        terbutaline (BRETHINE) injection 0.25 mg  0.25 mg Subcutaneous Once Shamar Caldwell MD        oxytocin (PITOCIN) 30 units in 500 mL infusion  1 avelino-units/min Intravenous Continuous PRN Shamar Caldwell MD        methylergonovine (METHERGINE) injection 200 mcg  200 mcg Intramuscular PRN Shamar Caldwell MD        carboprost (HEMABATE) injection 250 mcg  250 mcg Intramuscular PRN Shamar Caldwell MD        misoprostol (CYTOTEC) tablet 1,000 mcg  1,000 mcg Rectal PRN Thyra Shivers, MD        witch hazel-glycerin (TUCKS) pad   Topical PRN Shamar Caldwell MD        benzocaine-menthol (DERMOPLAST) 20-0.5 % spray   Topical PRN Shamar Caldwell MD           Allergies:  No Known Allergies    Problem List:    Patient Active Problem List   Diagnosis Code    Intermittent palpitations R00.2    Dizziness on standing R42       Past Medical History:        Diagnosis Date    Anemia 2019    taking extra iron during pregnancy    Generalized pruritus 2019    labs normal during pregnancy    Thyroid disease 2019    pt states her labs were high but she didn't follow up as she was supposed to       Past Surgical History:  History reviewed. No pertinent surgical history. Social History:    Social History     Tobacco Use    Smoking status: Never Smoker    Smokeless tobacco: Never Used   Substance Use Topics    Alcohol use:  No                                Counseling given: Not Answered      Vital Signs (Current):   Vitals:    05/08/19 0000 05/08/19 0030 05/08/19 0045 05/08/19 0050   BP: 135/79 139/77 (!) 142/87 139/67   Pulse: 88 103 118 75   Resp:       Temp:       TempSrc:       Weight:       Height:                                                  BP Readings from Last 3 Encounters:   05/08/19 139/67   02/14/19 110/60   01/17/19 118/69       NPO Status: Time of last liquid consumption: 1730                        Time of last solid consumption: 1730                        Date of last liquid consumption: 05/07/19                        Date of last solid food consumption: 05/07/19    BMI:   Wt Readings from Last 3 Encounters:   05/07/19 146 lb (66.2 kg)   02/14/19 139 lb (63 kg)   01/17/19 128 lb 4 oz (58.2 kg)     Body mass index is 26.7 kg/m². CBC:   Lab Results   Component Value Date    WBC 14.6 05/07/2019    RBC 3.74 05/07/2019    HGB 11.3 05/07/2019    HCT 34.3 05/07/2019    MCV 91.7 05/07/2019     05/07/2019       CMP:   Lab Results   Component Value Date     01/17/2019    K 4.0 01/17/2019     01/17/2019    CO2 24 01/17/2019    BUN 10 01/17/2019    CREATININE 0.5 01/17/2019    LABGLOM >90 01/17/2019    GLUCOSE 92 01/17/2019    CALCIUM 8.8 01/17/2019    AST 19 04/11/2019    ALT 9 04/11/2019       POC Tests: No results for input(s): POCGLU, POCNA, POCK, POCCL, POCBUN, POCHEMO, POCHCT in the last 72 hours. Coags: No results found for: PROTIME, INR, APTT    HCG (If Applicable): No results found for: PREGTESTUR, PREGSERUM, HCG, HCGQUANT     ABGs: No results found for: PHART, PO2ART, WBC0XXR, NOH5YMA, BEART, W9OGLZQJ     Type & Screen (If Applicable):  Lab Results   Component Value Date    LABRH POS 05/07/2019       Anesthesia Evaluation  Patient summary reviewed  Airway: Mallampati: III  TM distance: >3 FB   Neck ROM: full  Mouth opening: > = 3 FB Dental:          Pulmonary:                              Cardiovascular:                      Neuro/Psych:               GI/Hepatic/Renal:             Endo/Other:                     Abdominal:           Vascular:                                        Anesthesia Plan      epidural     ASA 2             Anesthetic plan and risks discussed with patient. Dexter Carrasco  [address] [place] [address]   5/8/2019

## 2019-05-08 NOTE — FLOWSHEET NOTE
Tension applied to cervical lynch, patient tolerated well. Encouraged slow easy breathes during contractions. Patient denies any additional needs at this time.

## 2019-05-08 NOTE — PLAN OF CARE
Problem: Fluid Volume - Imbalance:  Goal: Absence of postpartum hemorrhage signs and symptoms  Description  Absence of postpartum hemorrhage signs and symptoms  Outcome: Ongoing  Note:   Pt has small amount of rubra lochia draining     Problem: Pain:  Goal: Pain level will decrease  Description  Pain level will decrease  Outcome: Ongoing  Note:   Pt states pain medication working     Problem: Pain:  Goal: Control of acute pain  Description  Control of acute pain  Outcome: Ongoing  Note:   Pt states pain medication working     Problem: Pain:  Goal: Control of chronic pain  Description  Control of chronic pain  Outcome: Ongoing  Note:   Pt states pain medication working     Problem: Discharge Planning:  Goal: Discharged to appropriate level of care  Description  Discharged to appropriate level of care  Outcome: Ongoing  Note:   Pt to go home on day 2     Problem: Constipation:  Goal: Bowel elimination is within specified parameters  Description  Bowel elimination is within specified parameters  Outcome: Ongoing  Note:   Pt states drinking water to promote bowel elimination     Problem: Mood - Altered:  Goal: Mood stable  Description  Mood stable  Outcome: Ongoing  Note:   Pt calm and happy  Care plan reviewed with patient. Patient verbalize understanding of the plan of care and contribute to goal setting.

## 2019-05-09 LAB
ERYTHROCYTE [DISTWIDTH] IN BLOOD BY AUTOMATED COUNT: 14.1 % (ref 11.5–14.5)
ERYTHROCYTE [DISTWIDTH] IN BLOOD BY AUTOMATED COUNT: 48.3 FL (ref 35–45)
HCT VFR BLD CALC: 23.5 % (ref 37–47)
HEMOGLOBIN: 7.5 GM/DL (ref 12–16)
MCH RBC QN AUTO: 30.2 PG (ref 26–33)
MCHC RBC AUTO-ENTMCNC: 31.9 GM/DL (ref 32.2–35.5)
MCV RBC AUTO: 94.8 FL (ref 81–99)
PLATELET # BLD: 157 THOU/MM3 (ref 130–400)
PMV BLD AUTO: 10 FL (ref 9.4–12.4)
RBC # BLD: 2.48 MILL/MM3 (ref 4.2–5.4)
WBC # BLD: 16.9 THOU/MM3 (ref 4.8–10.8)

## 2019-05-09 PROCEDURE — 6370000000 HC RX 637 (ALT 250 FOR IP): Performed by: OBSTETRICS & GYNECOLOGY

## 2019-05-09 PROCEDURE — 1220000000 HC SEMI PRIVATE OB R&B

## 2019-05-09 PROCEDURE — 85027 COMPLETE CBC AUTOMATED: CPT

## 2019-05-09 PROCEDURE — 36415 COLL VENOUS BLD VENIPUNCTURE: CPT

## 2019-05-09 PROCEDURE — 2709999900 HC NON-CHARGEABLE SUPPLY

## 2019-05-09 RX ADMIN — HYDROCODONE BITARTRATE AND ACETAMINOPHEN 1 TABLET: 5; 325 TABLET ORAL at 10:32

## 2019-05-09 RX ADMIN — IBUPROFEN 800 MG: 800 TABLET ORAL at 15:17

## 2019-05-09 RX ADMIN — FERROUS SULFATE TAB 325 MG (65 MG ELEMENTAL FE) 325 MG: 325 (65 FE) TAB at 08:12

## 2019-05-09 RX ADMIN — HYDROCODONE BITARTRATE AND ACETAMINOPHEN 1 TABLET: 5; 325 TABLET ORAL at 05:38

## 2019-05-09 RX ADMIN — IBUPROFEN 800 MG: 800 TABLET ORAL at 06:32

## 2019-05-09 RX ADMIN — DOCUSATE SODIUM 100 MG: 100 CAPSULE, LIQUID FILLED ORAL at 08:12

## 2019-05-09 RX ADMIN — HYDROCODONE BITARTRATE AND ACETAMINOPHEN 1 TABLET: 5; 325 TABLET ORAL at 20:50

## 2019-05-09 RX ADMIN — DOCUSATE SODIUM 100 MG: 100 CAPSULE, LIQUID FILLED ORAL at 20:50

## 2019-05-09 RX ADMIN — HYDROCODONE BITARTRATE AND ACETAMINOPHEN 1 TABLET: 5; 325 TABLET ORAL at 15:17

## 2019-05-09 ASSESSMENT — PAIN SCALES - GENERAL
PAINLEVEL_OUTOF10: 3
PAINLEVEL_OUTOF10: 3
PAINLEVEL_OUTOF10: 5

## 2019-05-09 NOTE — LACTATION NOTE
Assisted Pt with latch. Infant was shallow. Redness noted on left nipple. Assisted Pt in getting a deeper latch. Pt states that feels more comfortable when infant is deeper after the first few seconds. Encouraged Pt to call out for assistance as needed if she is in pain and can't get a comfortable latch. Provided lanolin and breast pads. Will follow up PRN.

## 2019-05-09 NOTE — PLAN OF CARE
Problem: Fluid Volume - Imbalance:  Goal: Absence of postpartum hemorrhage signs and symptoms  Description  Absence of postpartum hemorrhage signs and symptoms  5/9/2019 0210 by Salena Baker RN  Note:   Small amount of lochia     Problem: Pain:  Goal: Pain level will decrease  Description  Pain level will decrease  5/9/2019 0210 by Salena Baker RN  Outcome: Ongoing  Note:   Pain controlled with oral pain medication     Problem: Pain:  Goal: Control of acute pain  Description  Control of acute pain  5/9/2019 0210 by Salena Baker RN  Outcome: Ongoing  Note:   Using oral pain medications, ice to perineum, heat for cramping     Problem: Discharge Planning:  Goal: Discharged to appropriate level of care  Description  Discharged to appropriate level of care  5/9/2019 0210 by Salena Baker RN  Note:   Discharge not anticipated today     Problem: Constipation:  Goal: Bowel elimination is within specified parameters  Description  Bowel elimination is within specified parameters  5/9/2019 0210 by Salena Baker RN  Note:   Taking stool softeners     Problem: Mood - Altered:  Goal: Mood stable  Description  Mood stable  5/9/2019 0210 by Salena Baker RN  Note:   Pleasant and cooperative   Care plan reviewed with patient and SO. Patient and SO verbalize understanding of the plan of care and contribute to goal setting.

## 2019-05-09 NOTE — ANESTHESIA POSTPROCEDURE EVALUATION
Department of Anesthesiology  Postprocedure Note    Patient: Christoph Bueno  MRN: [id number]  YOB: 2000  Date of evaluation: 5/9/2019  Time:  8:16 AM     Procedure Summary     Date:  05/08/19 Room / Location:      Anesthesia Start:  6606 Anesthesia Stop:  6052    Procedure:  Labor Analgesia Diagnosis:      Scheduled Providers:   Responsible Provider:  Linus Beauchamp DO    Anesthesia Type:  epidural ASA Status:  2          Anesthesia Type: No value filed. Jaz Phase I: Jaz Score: 9    Jaz Phase II: Jaz Score: 10    Last vitals: Reviewed and per EMR flowsheets. Anesthesia Post Evaluation    Patient location during evaluation: floor  Patient participation: complete - patient participated  Level of consciousness: awake and alert  Airway patency: patent  Nausea & Vomiting: no nausea and no vomiting  Complications: no  Cardiovascular status: hemodynamically stable  Respiratory status: acceptable and spontaneous ventilation  Hydration status: euvolemic  Comments: Patient voices no complaints or concerns at this time.

## 2019-05-09 NOTE — PROGRESS NOTES
Department of Obstetrics and Gynecology  Progress Note      S: doing well. No complaints. Lochia appropriate. Denies cp, sob, ct.       O:   Vitals:    19 0800   BP: 113/62   Pulse: 83   Resp: 17   Temp: 98.2 °F (36.8 °C)   SpO2:        Gen: no acute distress, up ambulating in room   Resp: breathing unlabored     Lab Results   Component Value Date    WBC 16.9 (H) 2019    HGB 7.5 (L) 2019    HCT 23.5 (L) 2019    MCV 94.8 2019     2019       A: 19 y.o.   PPD#1 s/p , acute post op anemia from pp hemorrhage due to laceration-asymptomatic, doing well. P:   1. A POS   2. Con't postpartum care-con't iron  3.  Anticipate d/c home tomorrow    Alex Urbina  12:29 PM  2019

## 2019-05-10 VITALS
WEIGHT: 146 LBS | OXYGEN SATURATION: 98 % | RESPIRATION RATE: 16 BRPM | HEIGHT: 62 IN | HEART RATE: 87 BPM | SYSTOLIC BLOOD PRESSURE: 116 MMHG | TEMPERATURE: 98.4 F | BODY MASS INDEX: 26.87 KG/M2 | DIASTOLIC BLOOD PRESSURE: 56 MMHG

## 2019-05-10 PROCEDURE — 6370000000 HC RX 637 (ALT 250 FOR IP): Performed by: OBSTETRICS & GYNECOLOGY

## 2019-05-10 PROCEDURE — 90471 IMMUNIZATION ADMIN: CPT | Performed by: OBSTETRICS & GYNECOLOGY

## 2019-05-10 PROCEDURE — 2709999900 HC NON-CHARGEABLE SUPPLY

## 2019-05-10 PROCEDURE — 90715 TDAP VACCINE 7 YRS/> IM: CPT | Performed by: OBSTETRICS & GYNECOLOGY

## 2019-05-10 PROCEDURE — 6360000002 HC RX W HCPCS: Performed by: OBSTETRICS & GYNECOLOGY

## 2019-05-10 RX ORDER — FERROUS SULFATE 325(65) MG
325 TABLET ORAL DAILY
Qty: 30 TABLET | Refills: 3 | Status: SHIPPED | OUTPATIENT
Start: 2019-05-10

## 2019-05-10 RX ORDER — PSEUDOEPHEDRINE HCL 30 MG
100 TABLET ORAL 2 TIMES DAILY PRN
COMMUNITY
Start: 2019-05-10

## 2019-05-10 RX ORDER — HYDROCODONE BITARTRATE AND ACETAMINOPHEN 5; 325 MG/1; MG/1
1 TABLET ORAL EVERY 6 HOURS PRN
Qty: 15 TABLET | Refills: 0 | Status: SHIPPED | OUTPATIENT
Start: 2019-05-10 | End: 2019-05-13

## 2019-05-10 RX ORDER — IBUPROFEN 600 MG/1
600 TABLET ORAL EVERY 6 HOURS PRN
Qty: 30 TABLET | Refills: 1 | COMMUNITY
Start: 2019-05-10

## 2019-05-10 RX ADMIN — HYDROCODONE BITARTRATE AND ACETAMINOPHEN 1 TABLET: 5; 325 TABLET ORAL at 07:38

## 2019-05-10 RX ADMIN — HYDROCODONE BITARTRATE AND ACETAMINOPHEN 1 TABLET: 5; 325 TABLET ORAL at 02:35

## 2019-05-10 RX ADMIN — HYDROCODONE BITARTRATE AND ACETAMINOPHEN 1 TABLET: 5; 325 TABLET ORAL at 11:55

## 2019-05-10 RX ADMIN — IBUPROFEN 800 MG: 800 TABLET ORAL at 11:55

## 2019-05-10 RX ADMIN — TETANUS TOXOID, REDUCED DIPHTHERIA TOXOID AND ACELLULAR PERTUSSIS VACCINE, ADSORBED 0.5 ML: 5; 2.5; 8; 8; 2.5 SUSPENSION INTRAMUSCULAR at 11:18

## 2019-05-10 RX ADMIN — DOCUSATE SODIUM 100 MG: 100 CAPSULE, LIQUID FILLED ORAL at 07:38

## 2019-05-10 RX ADMIN — FERROUS SULFATE TAB 325 MG (65 MG ELEMENTAL FE) 325 MG: 325 (65 FE) TAB at 07:38

## 2019-05-10 RX ADMIN — IBUPROFEN 800 MG: 800 TABLET ORAL at 02:35

## 2019-05-10 NOTE — FLOWSHEET NOTE
Discharge teaching and instructions completed with patient using teachback method. AVS reviewed. Printed prescriptions given to patient. Patient voiced understanding regarding prescriptions, follow up appointments, and care of self at home.

## 2019-05-10 NOTE — LACTATION NOTE
Pt. Stated she has no questions or concerns at this time. Encouraged pt. To make an appointment with out pt. Support group. Will follow up with pt. PRN.

## 2019-05-10 NOTE — PROGRESS NOTES
Department of Obstetrics and Gynecology  Progress Note      S: doing well. No complaints. Lochia appropriate. Denies cp, sob, ct.       O:   Vitals:    05/10/19 0742   BP: (!) 116/56   Pulse: 87   Resp: 16   Temp: 98.4 °F (36.9 °C)   SpO2:        Gen: no acute distress, up ambulating in room   Resp: breathing unlabored   Ext genitalia: b/l labial swelling, no induration    Lab Results   Component Value Date    WBC 16.9 (H) 2019    HGB 7.5 (L) 2019    HCT 23.5 (L) 2019    MCV 94.8 2019     2019       A: 19 y.o.   PPD#2 s/p , acute post op anemia from pp hemorrhage due to laceration-asymptomatic, doing well. P:   1. A POS   2. Con't postpartum care-con't iron  3.  Anticipate d/c home today-f/u in 6 wks for [facility] care    Sandra Leigh  8:30 AM  5/10/2019

## 2019-05-10 NOTE — PLAN OF CARE
Problem: Fluid Volume - Imbalance:  Goal: Absence of postpartum hemorrhage signs and symptoms  Description  Absence of postpartum hemorrhage signs and symptoms  5/10/2019 0138 by Nathan Stuart RN  Outcome: Ongoing  Note:   Small amount of lochia     Problem: Pain:  Goal: Pain level will decrease  Description  Pain level will decrease  5/10/2019 0138 by Nathan Stuart RN  Outcome: Ongoing  Note:   States relief with oral pain medication and ice packs     Problem: Pain:  Goal: Control of acute pain  Description  Control of acute pain  5/10/2019 0138 by Nathan Stuart RN  Outcome: Ongoing  Note:   Using oral pain medications and ice packs     Problem: Discharge Planning:  Goal: Discharged to appropriate level of care  Description  Discharged to appropriate level of care  5/10/2019 0138 by Nathan Stuart RN  Outcome: Ongoing  Note:   Plan is for discharge home today     Problem: Constipation:  Goal: Bowel elimination is within specified parameters  Description  Bowel elimination is within specified parameters  5/10/2019 0138 by Nathan Stuart RN  Outcome: Ongoing  Note:   Taking stool softeners     Problem: Mood - Altered:  Goal: Mood stable  Description  Mood stable  5/10/2019 0138 by Nathan Stuart RN  Outcome: Ongoing  Note:   Pleasant and cooperative   Care plan reviewed with patient and SO. Patient and SO verbalize understanding of the plan of care and contribute to goal setting.

## 2019-05-14 ENCOUNTER — HOSPITAL ENCOUNTER (INPATIENT)
Age: 19
LOS: 3 days | Discharge: HOME | DRG: 776 | End: 2019-05-17
Attending: OBSTETRICS & GYNECOLOGY | Admitting: OBSTETRICS & GYNECOLOGY
Payer: COMMERCIAL

## 2019-05-14 PROBLEM — R10.2 PELVIC PAIN: Status: ACTIVE | Noted: 2019-05-14

## 2019-05-14 PROBLEM — N71.9 ENDOMETRITIS: Status: ACTIVE | Noted: 2019-05-14

## 2019-05-14 LAB
ALBUMIN SERPL-MCNC: 3.5 G/DL (ref 3.5–5.1)
ALP BLD-CCNC: 123 U/L (ref 38–126)
ALT SERPL-CCNC: 18 U/L (ref 11–66)
ANION GAP SERPL CALCULATED.3IONS-SCNC: 15 MEQ/L (ref 8–16)
AST SERPL-CCNC: 22 U/L (ref 5–40)
BACTERIA: ABNORMAL
BASOPHILS # BLD: 0 %
BASOPHILS ABSOLUTE: 0 THOU/MM3 (ref 0–0.1)
BILIRUB SERPL-MCNC: 0.2 MG/DL (ref 0.3–1.2)
BILIRUBIN URINE: NEGATIVE
BLOOD, URINE: ABNORMAL
BUN BLDV-MCNC: 11 MG/DL (ref 7–22)
CALCIUM SERPL-MCNC: 8.5 MG/DL (ref 8.5–10.5)
CASTS: ABNORMAL /LPF
CASTS: ABNORMAL /LPF
CHARACTER, URINE: CLEAR
CHLORIDE BLD-SCNC: 100 MEQ/L (ref 98–111)
CO2: 21 MEQ/L (ref 23–33)
COLOR: YELLOW
CREAT SERPL-MCNC: 0.6 MG/DL (ref 0.4–1.2)
CRYSTALS: ABNORMAL
EOSINOPHIL # BLD: 0 %
EOSINOPHILS ABSOLUTE: 0 THOU/MM3 (ref 0–0.4)
EPITHELIAL CELLS, UA: ABNORMAL /HPF
ERYTHROCYTE [DISTWIDTH] IN BLOOD BY AUTOMATED COUNT: 14.1 % (ref 11.5–14.5)
ERYTHROCYTE [DISTWIDTH] IN BLOOD BY AUTOMATED COUNT: 47.5 FL (ref 35–45)
GFR SERPL CREATININE-BSD FRML MDRD: > 90 ML/MIN/1.73M2
GLUCOSE BLD-MCNC: 114 MG/DL (ref 70–108)
GLUCOSE, URINE: NEGATIVE MG/DL
HCT VFR BLD CALC: 28.1 % (ref 37–47)
HEMOGLOBIN: 9.3 GM/DL (ref 12–16)
IMMATURE GRANS (ABS): 0.91 THOU/MM3 (ref 0–0.07)
IMMATURE GRANULOCYTES: 5.1 %
KETONES, URINE: NEGATIVE
LACTIC ACID, SEPSIS: 0.9 MMOL/L (ref 0.5–1.9)
LACTIC ACID: 0.9 MMOL/L (ref 0.5–2.2)
LEUKOCYTE ESTERASE, URINE: ABNORMAL
LYMPHOCYTES # BLD: 7 %
LYMPHOCYTES ABSOLUTE: 1.2 THOU/MM3 (ref 1–4.8)
MCH RBC QN AUTO: 30.6 PG (ref 26–33)
MCHC RBC AUTO-ENTMCNC: 33.1 GM/DL (ref 32.2–35.5)
MCV RBC AUTO: 92.4 FL (ref 81–99)
MISCELLANEOUS LAB TEST RESULT: ABNORMAL
MONOCYTES # BLD: 5 %
MONOCYTES ABSOLUTE: 0.9 THOU/MM3 (ref 0.4–1.3)
NITRITE, URINE: NEGATIVE
NUCLEATED RED BLOOD CELLS: 1 /100 WBC
PH UA: 7 (ref 5–9)
PLATELET # BLD: 264 THOU/MM3 (ref 130–400)
PMV BLD AUTO: 9.1 FL (ref 9.4–12.4)
POTASSIUM REFLEX MAGNESIUM: 3.9 MEQ/L (ref 3.5–5.2)
PROCALCITONIN: 0.1 NG/ML (ref 0.01–0.09)
PROTEIN UA: NEGATIVE MG/DL
RBC # BLD: 3.04 MILL/MM3 (ref 4.2–5.4)
RBC URINE: ABNORMAL /HPF
RENAL EPITHELIAL, UA: ABNORMAL
SCAN OF BLOOD SMEAR: NORMAL
SEG NEUTROPHILS: 83 %
SEGMENTED NEUTROPHILS ABSOLUTE COUNT: 14.8 THOU/MM3 (ref 1.8–7.7)
SODIUM BLD-SCNC: 136 MEQ/L (ref 135–145)
SPECIFIC GRAVITY UA: 1.02 (ref 1–1.03)
TOTAL PROTEIN: 7.5 G/DL (ref 6.1–8)
UROBILINOGEN, URINE: 1 EU/DL (ref 0–1)
WBC # BLD: 17.8 THOU/MM3 (ref 4.8–10.8)
WBC UA: ABNORMAL /HPF
YEAST: ABNORMAL

## 2019-05-14 PROCEDURE — 2709999900 HC NON-CHARGEABLE SUPPLY

## 2019-05-14 PROCEDURE — 2580000003 HC RX 258: Performed by: OBSTETRICS & GYNECOLOGY

## 2019-05-14 PROCEDURE — 36415 COLL VENOUS BLD VENIPUNCTURE: CPT

## 2019-05-14 PROCEDURE — 83605 ASSAY OF LACTIC ACID: CPT

## 2019-05-14 PROCEDURE — 87086 URINE CULTURE/COLONY COUNT: CPT

## 2019-05-14 PROCEDURE — 87186 SC STD MICRODIL/AGAR DIL: CPT

## 2019-05-14 PROCEDURE — 87184 SC STD DISK METHOD PER PLATE: CPT

## 2019-05-14 PROCEDURE — 6370000000 HC RX 637 (ALT 250 FOR IP): Performed by: OBSTETRICS & GYNECOLOGY

## 2019-05-14 PROCEDURE — 80053 COMPREHEN METABOLIC PANEL: CPT

## 2019-05-14 PROCEDURE — 85025 COMPLETE CBC W/AUTO DIFF WBC: CPT

## 2019-05-14 PROCEDURE — 87077 CULTURE AEROBIC IDENTIFY: CPT

## 2019-05-14 PROCEDURE — 84145 PROCALCITONIN (PCT): CPT

## 2019-05-14 PROCEDURE — 81001 URINALYSIS AUTO W/SCOPE: CPT

## 2019-05-14 PROCEDURE — 87040 BLOOD CULTURE FOR BACTERIA: CPT

## 2019-05-14 PROCEDURE — 1200000000 HC SEMI PRIVATE

## 2019-05-14 PROCEDURE — 6360000002 HC RX W HCPCS: Performed by: OBSTETRICS & GYNECOLOGY

## 2019-05-14 RX ORDER — SODIUM CHLORIDE, SODIUM LACTATE, POTASSIUM CHLORIDE, AND CALCIUM CHLORIDE .6; .31; .03; .02 G/100ML; G/100ML; G/100ML; G/100ML
30 INJECTION, SOLUTION INTRAVENOUS ONCE
Status: COMPLETED | OUTPATIENT
Start: 2019-05-14 | End: 2019-05-14

## 2019-05-14 RX ORDER — FAMOTIDINE 20 MG/1
20 TABLET, FILM COATED ORAL 2 TIMES DAILY
Status: DISCONTINUED | OUTPATIENT
Start: 2019-05-14 | End: 2019-05-17 | Stop reason: HOSPADM

## 2019-05-14 RX ORDER — HYDROCODONE BITARTRATE AND ACETAMINOPHEN 5; 325 MG/1; MG/1
1 TABLET ORAL EVERY 6 HOURS PRN
COMMUNITY

## 2019-05-14 RX ORDER — SODIUM CHLORIDE 0.9 % (FLUSH) 0.9 %
10 SYRINGE (ML) INJECTION PRN
Status: DISCONTINUED | OUTPATIENT
Start: 2019-05-14 | End: 2019-05-17 | Stop reason: HOSPADM

## 2019-05-14 RX ORDER — SODIUM CHLORIDE 0.9 % (FLUSH) 0.9 %
10 SYRINGE (ML) INJECTION EVERY 12 HOURS SCHEDULED
Status: DISCONTINUED | OUTPATIENT
Start: 2019-05-14 | End: 2019-05-17 | Stop reason: HOSPADM

## 2019-05-14 RX ORDER — ZOLPIDEM TARTRATE 5 MG/1
5 TABLET ORAL NIGHTLY PRN
Status: DISCONTINUED | OUTPATIENT
Start: 2019-05-14 | End: 2019-05-17 | Stop reason: HOSPADM

## 2019-05-14 RX ORDER — ACETAMINOPHEN 325 MG/1
650 TABLET ORAL EVERY 4 HOURS PRN
Status: DISCONTINUED | OUTPATIENT
Start: 2019-05-14 | End: 2019-05-17 | Stop reason: HOSPADM

## 2019-05-14 RX ORDER — DOCUSATE SODIUM 100 MG/1
100 CAPSULE, LIQUID FILLED ORAL 2 TIMES DAILY PRN
Status: DISCONTINUED | OUTPATIENT
Start: 2019-05-14 | End: 2019-05-17 | Stop reason: HOSPADM

## 2019-05-14 RX ORDER — IBUPROFEN 600 MG/1
600 TABLET ORAL EVERY 6 HOURS PRN
Status: DISCONTINUED | OUTPATIENT
Start: 2019-05-14 | End: 2019-05-17 | Stop reason: HOSPADM

## 2019-05-14 RX ADMIN — SODIUM CHLORIDE, POTASSIUM CHLORIDE, SODIUM LACTATE AND CALCIUM CHLORIDE 1824 ML: 600; 310; 30; 20 INJECTION, SOLUTION INTRAVENOUS at 15:53

## 2019-05-14 RX ADMIN — IBUPROFEN 600 MG: 600 TABLET ORAL at 18:47

## 2019-05-14 RX ADMIN — ZOLPIDEM TARTRATE 5 MG: 5 TABLET ORAL at 21:29

## 2019-05-14 RX ADMIN — FAMOTIDINE 20 MG: 20 TABLET ORAL at 21:29

## 2019-05-14 RX ADMIN — PIPERACILLIN AND TAZOBACTAM 3.38 G: 3; .375 INJECTION, POWDER, FOR SOLUTION INTRAVENOUS at 17:54

## 2019-05-14 RX ADMIN — ACETAMINOPHEN 650 MG: 325 TABLET ORAL at 15:11

## 2019-05-14 ASSESSMENT — PAIN DESCRIPTION - LOCATION: LOCATION: PERINEUM

## 2019-05-14 ASSESSMENT — PAIN DESCRIPTION - PAIN TYPE: TYPE: ACUTE PAIN

## 2019-05-14 ASSESSMENT — PAIN SCALES - GENERAL
PAINLEVEL_OUTOF10: 9
PAINLEVEL_OUTOF10: 9
PAINLEVEL_OUTOF10: 4

## 2019-05-14 NOTE — PROGRESS NOTES
MEWS score 6 Dr. Seema Lopez here to see pt and assess her new labs Lactic Acid were being drawn medication changed to Motrin to help with fever pt remains in stable condition.

## 2019-05-14 NOTE — FLOWSHEET NOTE
05/14/19 1745   Provider Notification   Reason for Communication Review case   Provider Name Ed Rodriguez   Provider Notification Physician   Method of Communication Secure Message   Response Waiting for response   Notification Time 1745     temp is 103.0 -- that is 2 hours post Tylenol.  I will apply ice packs, but can we also do Motrin

## 2019-05-14 NOTE — PROGRESS NOTES
Patient arrived to 10E48 by wheelchair. Patient fatigued, nauseated and chilling. Tempt orally 102.4. C/o burning with urination. Voided clear yellow urine. No blood noted on oz pad. Denies emesis today. Oriented to room and call system. Bed alarm turned on. Call light within reach.

## 2019-05-15 LAB
ANION GAP SERPL CALCULATED.3IONS-SCNC: 15 MEQ/L (ref 8–16)
BASOPHILS # BLD: 0.5 %
BASOPHILS ABSOLUTE: 0.1 THOU/MM3 (ref 0–0.1)
BUN BLDV-MCNC: 11 MG/DL (ref 7–22)
CALCIUM SERPL-MCNC: 8.3 MG/DL (ref 8.5–10.5)
CHLORIDE BLD-SCNC: 108 MEQ/L (ref 98–111)
CO2: 20 MEQ/L (ref 23–33)
CREAT SERPL-MCNC: 0.6 MG/DL (ref 0.4–1.2)
EOSINOPHIL # BLD: 0.6 %
EOSINOPHILS ABSOLUTE: 0.1 THOU/MM3 (ref 0–0.4)
ERYTHROCYTE [DISTWIDTH] IN BLOOD BY AUTOMATED COUNT: 14.2 % (ref 11.5–14.5)
ERYTHROCYTE [DISTWIDTH] IN BLOOD BY AUTOMATED COUNT: 49.9 FL (ref 35–45)
GFR SERPL CREATININE-BSD FRML MDRD: > 90 ML/MIN/1.73M2
GLUCOSE BLD-MCNC: 110 MG/DL (ref 70–108)
HCT VFR BLD CALC: 26.2 % (ref 37–47)
HEMOGLOBIN: 8.2 GM/DL (ref 12–16)
IMMATURE GRANS (ABS): 0.76 THOU/MM3 (ref 0–0.07)
IMMATURE GRANULOCYTES: 5.9 %
LYMPHOCYTES # BLD: 10.3 %
LYMPHOCYTES ABSOLUTE: 1.3 THOU/MM3 (ref 1–4.8)
MCH RBC QN AUTO: 29.6 PG (ref 26–33)
MCHC RBC AUTO-ENTMCNC: 31.3 GM/DL (ref 32.2–35.5)
MCV RBC AUTO: 94.6 FL (ref 81–99)
MONOCYTES # BLD: 8.3 %
MONOCYTES ABSOLUTE: 1.1 THOU/MM3 (ref 0.4–1.3)
NUCLEATED RED BLOOD CELLS: 0 /100 WBC
PLATELET # BLD: 247 THOU/MM3 (ref 130–400)
PLATELET ESTIMATE: ADEQUATE
PMV BLD AUTO: 9 FL (ref 9.4–12.4)
POTASSIUM SERPL-SCNC: 3.6 MEQ/L (ref 3.5–5.2)
RBC # BLD: 2.77 MILL/MM3 (ref 4.2–5.4)
SCAN OF BLOOD SMEAR: NORMAL
SEG NEUTROPHILS: 74.4 %
SEGMENTED NEUTROPHILS ABSOLUTE COUNT: 9.6 THOU/MM3 (ref 1.8–7.7)
SODIUM BLD-SCNC: 143 MEQ/L (ref 135–145)
WBC # BLD: 12.9 THOU/MM3 (ref 4.8–10.8)

## 2019-05-15 PROCEDURE — 36415 COLL VENOUS BLD VENIPUNCTURE: CPT

## 2019-05-15 PROCEDURE — 6360000002 HC RX W HCPCS: Performed by: OBSTETRICS & GYNECOLOGY

## 2019-05-15 PROCEDURE — 85025 COMPLETE CBC W/AUTO DIFF WBC: CPT

## 2019-05-15 PROCEDURE — 1200000000 HC SEMI PRIVATE

## 2019-05-15 PROCEDURE — 2709999900 HC NON-CHARGEABLE SUPPLY

## 2019-05-15 PROCEDURE — 80048 BASIC METABOLIC PNL TOTAL CA: CPT

## 2019-05-15 PROCEDURE — 6370000000 HC RX 637 (ALT 250 FOR IP): Performed by: OBSTETRICS & GYNECOLOGY

## 2019-05-15 PROCEDURE — 2580000003 HC RX 258: Performed by: OBSTETRICS & GYNECOLOGY

## 2019-05-15 RX ADMIN — FAMOTIDINE 20 MG: 20 TABLET ORAL at 20:08

## 2019-05-15 RX ADMIN — IBUPROFEN 600 MG: 600 TABLET ORAL at 12:47

## 2019-05-15 RX ADMIN — FAMOTIDINE 20 MG: 20 TABLET ORAL at 09:21

## 2019-05-15 RX ADMIN — IBUPROFEN 600 MG: 600 TABLET ORAL at 06:10

## 2019-05-15 RX ADMIN — PIPERACILLIN AND TAZOBACTAM 3.38 G: 3; .375 INJECTION, POWDER, FOR SOLUTION INTRAVENOUS at 00:10

## 2019-05-15 RX ADMIN — IBUPROFEN 600 MG: 600 TABLET ORAL at 00:10

## 2019-05-15 RX ADMIN — PIPERACILLIN AND TAZOBACTAM 3.38 G: 3; .375 INJECTION, POWDER, FOR SOLUTION INTRAVENOUS at 08:10

## 2019-05-15 RX ADMIN — ACETAMINOPHEN 650 MG: 325 TABLET ORAL at 21:40

## 2019-05-15 RX ADMIN — DOCUSATE SODIUM 100 MG: 100 CAPSULE, LIQUID FILLED ORAL at 13:02

## 2019-05-15 RX ADMIN — ZOLPIDEM TARTRATE 5 MG: 5 TABLET ORAL at 22:59

## 2019-05-15 RX ADMIN — ACETAMINOPHEN 650 MG: 325 TABLET ORAL at 14:35

## 2019-05-15 RX ADMIN — Medication 10 ML: at 08:10

## 2019-05-15 RX ADMIN — PIPERACILLIN AND TAZOBACTAM 3.38 G: 3; .375 INJECTION, POWDER, FOR SOLUTION INTRAVENOUS at 16:22

## 2019-05-15 RX ADMIN — Medication 10 ML: at 13:02

## 2019-05-15 RX ADMIN — IBUPROFEN 600 MG: 600 TABLET ORAL at 18:59

## 2019-05-15 ASSESSMENT — PAIN DESCRIPTION - PAIN TYPE
TYPE: ACUTE PAIN
TYPE: SURGICAL PAIN

## 2019-05-15 ASSESSMENT — PAIN SCALES - GENERAL
PAINLEVEL_OUTOF10: 0
PAINLEVEL_OUTOF10: 5
PAINLEVEL_OUTOF10: 3
PAINLEVEL_OUTOF10: 6
PAINLEVEL_OUTOF10: 8
PAINLEVEL_OUTOF10: 4
PAINLEVEL_OUTOF10: 4
PAINLEVEL_OUTOF10: 0
PAINLEVEL_OUTOF10: 0
PAINLEVEL_OUTOF10: 5

## 2019-05-15 ASSESSMENT — PAIN DESCRIPTION - PROGRESSION: CLINICAL_PROGRESSION: GRADUALLY IMPROVING

## 2019-05-15 ASSESSMENT — PAIN DESCRIPTION - LOCATION
LOCATION: ABDOMEN
LOCATION: PERINEUM

## 2019-05-15 ASSESSMENT — PAIN DESCRIPTION - ONSET: ONSET: GRADUAL

## 2019-05-15 ASSESSMENT — PAIN DESCRIPTION - FREQUENCY: FREQUENCY: INTERMITTENT

## 2019-05-15 ASSESSMENT — PAIN DESCRIPTION - DESCRIPTORS: DESCRIPTORS: ACHING;DISCOMFORT

## 2019-05-15 NOTE — PROGRESS NOTES
Pt states she id not feeling well. C/o 8/10 general body aches. Dr Olmstead Qualerica through prefect serve to notify.

## 2019-05-15 NOTE — LACTATION NOTE
Followed up with Patient. Discussed pump settings with Patient. Assisted Patient in pumping. Patient decided she would like to latch infant. Encouraged her to give EBM to infant after she latches. Encouraged Patient to supplement with formula after EBM if infant is still hungry. Encouraged Patient to keep latching or pumping every three hours if she would like to continues to breastfeed. RN notified of Patient plan. Will follow up PRN.

## 2019-05-15 NOTE — PLAN OF CARE
Problem: Pain:  Goal: Pain level will decrease  Description  Pain level will decrease  5/15/2019 1847 by Stefanie Zuniga RN  Outcome: Ongoing  Note:   Pt takeing tylenol and motrin ot help with pain control and and generalized body aches     Problem: GI  Goal: No bowel complications  Outcome: Ongoing  Note:   Pt has 1 bm today     Problem:   Goal: Adequate urinary output  5/15/2019 1847 by Stefanie Zuniga RN  Outcome: Ongoing  Note:   Pt voiding well, but still c/o pain with urination      Problem: Discharge Planning  Intervention: Interaction with patient/family and care team  5/15/2019 1847 by Stefanie Zuniga RN  Note:   Pt plans on going home with help from family  5/15/2019 0908 by Stefanie Zuniga RN  Note:   Pt plans on going home with help from family     Care plan reviewed with patient. Patient verbalize understanding of the plan of care and contribute to goal setting.

## 2019-05-15 NOTE — PLAN OF CARE
Problem: Pain:  Goal: Pain level will decrease  Description  Pain level will decrease  5/15/2019 0908 by Stefanie Zuniga RN  Outcome: Ongoing  Note:   Pt taking motrin for pain control     Problem:   Goal: Adequate urinary output  5/15/2019 0908 by Stefanie Zuniga RN  Outcome: Ongoing  Note:   Pt voiding well. Pt still c/o burning when voiding     Problem: Discharge Planning  Intervention: Interaction with patient/family and care team  Note:   Pt plans on going home with help from family    Care plan reviewed with patient. Patient  verbalize understanding of the plan of care and contribute to goal setting.

## 2019-05-15 NOTE — CARE COORDINATION
5/15/19, 10:31 AM      Lewis Johnson       [facility]   Location: [address] [place] [address] Reason for admit: Pelvic pain [R10.2]  Endometritis [N71.9] Status: IP  Admit order signed?: yes  PMH:  has a past medical history of Anemia, Generalized pruritus, and Thyroid disease. Procedure: none  Pertinent abnormal Imaging:none  Medications:  Scheduled Meds:   sodium chloride flush  10 mL Intravenous 2 times per day    famotidine  20 mg Oral BID    piperacillin-tazobactam  3.375 g Intravenous Q8H    enoxaparin  40 mg Subcutaneous Q24H     Continuous Infusions:   Pertinent Info/Orders/Treatment Plan:  OBGYN following. Post vaginal delivery on 5/10/19. Admitted for pelvic pain. WBC 12.9. IV zosyn. Ibuprofen. Diet: DIET GENERAL;   Smoking status:  reports that she has never smoked. She has never used smokeless tobacco.   PCP: Leanna Strickland MD  Readmission: Patient has been readmitted within 4 days. Patient went to f/u appointment? yes  If yes, was it within 7 days? yes  Patient was able to fill prescriptions? yes  Patient is taking medications as prescribed? yes  Cause for readmission? Pelvic pain post vaginal delivery on 5/10/19    Readmission Risk Score: 6%    Discharge Planning  Current Residence:  Private Residence  Living Arrangements:  Spouse/Significant Other   Support Systems:  Family Members, Parent, Spouse/Significant Other  Current Services PTA:     Potential Assistance Needed:  N/A  Potential Assistance Purchasing Medications:  No  Does patient want to participate in local refill/ meds to beds program?     Type of Home Care Services:  None  Patient expects to be discharged to:     Expected Discharge date: Follow Up Appointment: Best Day/ Time:      Discharge Plan: Spoke with patient, plans to return home with infant and family. Denies discharge needs.       Evaluation: no

## 2019-05-15 NOTE — PLAN OF CARE
Problem: Pain:  Goal: Pain level will decrease  Description  Pain level will decrease  Outcome: Met This Shift  Note:   Pt is taking oral pain medication with relief     Problem:   Goal: Adequate urinary output  Note:   Pt is voiding sufficient amounts

## 2019-05-15 NOTE — H&P
[address] [place] [address]                              HISTORY AND PHYSICAL    PATIENT NAME: Elan Torres                      :        2000  MED REC NO:   [id number]                           ROOM:       [id number]  ACCOUNT NO:   [de-identified]                           ADMIT DATE: 2019  PROVIDER:     Erick Osullivan. Seema Lopez M.D.    [facility]:  Dysuria and pain. b HISTORY OF PRESENT ILLNESS:  The patient had onset of dysuria, body  aches, and pain starting , 2019. She had a baby on  2019 and went home on 05/10/2019 and presented back now today with  body aches, fever, chills, and pelvic pain. She tried Tylenol at home,  but presented to the office with the above complaints. Recent history, the patient delivered a 6 pound 13 ounce baby on  2019 and had a significant laceration at that time, which was  repaired. She was _____ well enough to go home on 05/10/2019 and then  re-presented today. PAST MEDICAL HISTORY:  None. PAST SURGICAL HISTORY:  None. ALLERGIES:  None. MEDICATIONS:  Iron, Norco, and prenatal vitamins. SOCIAL HISTORY:  The patient does not use alcohol, tobacco, or illegal  drugs. FAMILY HISTORY:  Noncontributory. REVIEW OF SYSTEMS:  Negative, except as above. She does endorse dysuria  and back pain. PHYSICAL EXAMINATION:  GENERAL:  Pale white female, in no acute distress, but clearly feeling  poorly. HEENT:  Benign. LUNGS:  Clear to auscultation. HEART:  Tachycardic, but regular rhythm. ABDOMEN:  Soft. Upper abdomen, nontender. Lower abdomen, tender  throughout, but especially suprapubic. EXTREMITIES:  No clubbing, cyanosis, or edema. GENITALIA:  External female genitalia with a small bruise, but no  induration and no erythema of the episiotomy site. LABORATORY DATA:  White count of 17, lactic acid 0.9. Urinalysis,  greater than 100 whites.   Blood cultures pending. Procalcitonin  pending. ASSESSMENT:  Endometritis versus pyelonephritis. PLAN:  IV Zosyn, antipyretics, and followup on cultures.         Getachew Corey M.D.    D: 05/14/2019 20:05:45       T: 05/14/2019 22:41:25     [email or web address]  Job#: [id number]     Doc#: [id number]    CC:

## 2019-05-16 LAB
ATYPICAL LYMPHOCYTES: ABNORMAL %
BASOPHILIA: ABNORMAL
BASOPHILS # BLD: 0.6 %
BASOPHILS ABSOLUTE: 0.1 THOU/MM3 (ref 0–0.1)
EOSINOPHIL # BLD: 2.4 %
EOSINOPHILS ABSOLUTE: 0.2 THOU/MM3 (ref 0–0.4)
ERYTHROCYTE [DISTWIDTH] IN BLOOD BY AUTOMATED COUNT: 14.2 % (ref 11.5–14.5)
ERYTHROCYTE [DISTWIDTH] IN BLOOD BY AUTOMATED COUNT: 49.2 FL (ref 35–45)
HCT VFR BLD CALC: 25.6 % (ref 37–47)
HEMOGLOBIN: 8 GM/DL (ref 12–16)
IMMATURE GRANS (ABS): 0.59 THOU/MM3 (ref 0–0.07)
IMMATURE GRANULOCYTES: 5.9 %
LYMPHOCYTES # BLD: 21.7 %
LYMPHOCYTES ABSOLUTE: 2.2 THOU/MM3 (ref 1–4.8)
MCH RBC QN AUTO: 29.7 PG (ref 26–33)
MCHC RBC AUTO-ENTMCNC: 31.3 GM/DL (ref 32.2–35.5)
MCV RBC AUTO: 95.2 FL (ref 81–99)
MONOCYTES # BLD: 10.9 %
MONOCYTES ABSOLUTE: 1.1 THOU/MM3 (ref 0.4–1.3)
NUCLEATED RED BLOOD CELLS: 0 /100 WBC
PLATELET # BLD: 248 THOU/MM3 (ref 130–400)
PMV BLD AUTO: 9.1 FL (ref 9.4–12.4)
RBC # BLD: 2.69 MILL/MM3 (ref 4.2–5.4)
SCAN OF BLOOD SMEAR: NORMAL
SEG NEUTROPHILS: 58.5 %
SEGMENTED NEUTROPHILS ABSOLUTE COUNT: 5.9 THOU/MM3 (ref 1.8–7.7)
WBC # BLD: 10 THOU/MM3 (ref 4.8–10.8)

## 2019-05-16 PROCEDURE — 85025 COMPLETE CBC W/AUTO DIFF WBC: CPT

## 2019-05-16 PROCEDURE — 36415 COLL VENOUS BLD VENIPUNCTURE: CPT

## 2019-05-16 PROCEDURE — 2709999900 HC NON-CHARGEABLE SUPPLY

## 2019-05-16 PROCEDURE — 6370000000 HC RX 637 (ALT 250 FOR IP): Performed by: OBSTETRICS & GYNECOLOGY

## 2019-05-16 PROCEDURE — 6360000002 HC RX W HCPCS: Performed by: OBSTETRICS & GYNECOLOGY

## 2019-05-16 PROCEDURE — 1200000000 HC SEMI PRIVATE

## 2019-05-16 PROCEDURE — 2580000003 HC RX 258: Performed by: OBSTETRICS & GYNECOLOGY

## 2019-05-16 RX ORDER — FERROUS SULFATE 325(65) MG
325 TABLET ORAL
Status: DISCONTINUED | OUTPATIENT
Start: 2019-05-16 | End: 2019-05-17 | Stop reason: HOSPADM

## 2019-05-16 RX ADMIN — DOCUSATE SODIUM 100 MG: 100 CAPSULE, LIQUID FILLED ORAL at 08:06

## 2019-05-16 RX ADMIN — PIPERACILLIN AND TAZOBACTAM 3.38 G: 3; .375 INJECTION, POWDER, FOR SOLUTION INTRAVENOUS at 00:33

## 2019-05-16 RX ADMIN — PIPERACILLIN AND TAZOBACTAM 3.38 G: 3; .375 INJECTION, POWDER, FOR SOLUTION INTRAVENOUS at 16:32

## 2019-05-16 RX ADMIN — ACETAMINOPHEN 650 MG: 325 TABLET ORAL at 04:38

## 2019-05-16 RX ADMIN — FAMOTIDINE 20 MG: 20 TABLET ORAL at 08:06

## 2019-05-16 RX ADMIN — PIPERACILLIN AND TAZOBACTAM 3.38 G: 3; .375 INJECTION, POWDER, FOR SOLUTION INTRAVENOUS at 08:06

## 2019-05-16 RX ADMIN — ACETAMINOPHEN 650 MG: 325 TABLET ORAL at 16:33

## 2019-05-16 RX ADMIN — FAMOTIDINE 20 MG: 20 TABLET ORAL at 20:11

## 2019-05-16 RX ADMIN — FERROUS SULFATE TAB 325 MG (65 MG ELEMENTAL FE) 325 MG: 325 (65 FE) TAB at 15:10

## 2019-05-16 RX ADMIN — Medication 10 ML: at 08:06

## 2019-05-16 ASSESSMENT — PAIN DESCRIPTION - PAIN TYPE: TYPE: ACUTE PAIN

## 2019-05-16 ASSESSMENT — PAIN DESCRIPTION - LOCATION: LOCATION: ABDOMEN

## 2019-05-16 ASSESSMENT — PAIN SCALES - GENERAL
PAINLEVEL_OUTOF10: 4
PAINLEVEL_OUTOF10: 0
PAINLEVEL_OUTOF10: 3

## 2019-05-16 NOTE — CARE COORDINATION
19, 10:12 AM      [place] day: 2  [facility] Reason for admit: Pelvic pain [R10.2]  Endometritis [N71.9]   Procedure: na    Treatment Plan of Care: Tmax 100.5(ax), WBC 10.0 (was 12.9), lactation consulted,       PCP: Bailey Milligan MD  Readmission Risk Score: 6%      Discharge Plan: plans home with  infant and family when medically stable.

## 2019-05-16 NOTE — FLOWSHEET NOTE
Pt is a 18 yo new mother. Pt had a baby 7 days ago and now has a pelvic infection. Pt's grandfather was in the room. The baby's paternal grandparents are watching the baby. Pt's  was present earlier, but he wants to be with the baby. Pt has never attended [employer]. Spiritual care to continue to offer support and to allow pt and family to share their concerns. 05/15/19 8453   Encounter Summary   Services provided to: Patient and family together   Referral/Consult From: [address] [place] [address] Parent; Children;Family members;Spouse   Continue Visiting Yes  (5/15)   Complexity of Encounter Low   Length of Encounter 15 minutes   Spiritual/Jew   Type Spiritual support   Assessment Approachable; Anxious   Intervention Active listening;Explored feelings, thoughts, concerns;Explored coping resources;Sustaining presence/ Ministry of presence   Outcome Comfort;Engaged in conversation

## 2019-05-16 NOTE — LACTATION NOTE
Patient reports that she has been pumping every three hours and has started to get more milk. Patient still reports that she is tender. Patient's Mother brought cabbage leaves in. Encouraged Patient to place cold cabbage leaves on breast for 20 minutes for no more than 2-3 time a day. Encouraged warm compress for mild engorgement and ice for severe engorgement, handout provided. Patient states no other questions at this time. Encouraged Patient to call for assistance as needed. Will follow up PRN.

## 2019-05-16 NOTE — PROGRESS NOTES
S: feeling much better than she has in a while. Denies any back or abdominal pain currently. Continues to have trouble with her right breast. Will call lactation again today. Denies any pain with voiding. O:  Vitals:    19 1518   BP: 120/60   Pulse: 92   Resp: 16   Temp: 99.2 °F (37.3 °C)   SpO2:    last fever 100.5 5/15/19 @ 1400  t max 103 @ 1744 19    Gen: no acute distress, well appearing, ambulating in the room    Lab Results   Component Value Date    WBC 10.0 2019    HGB 8.0 (L) 2019    HCT 25.6 (L) 2019    MCV 95.2 2019     2019     Lab Results   Component Value Date    COLORU YELLOW 2019    NITRU NEGATIVE 2019    KETUA NEGATIVE 2019    UROBILINOGEN 1.0 2019    BILIRUBINUR NEGATIVE 2019     Blood cultures neg to date-final pending  Urine culture <10,0000 gram neg    A: 18 yo  HD#3 PPD#8 s/p  with PP hemorrhage for vaginal laceration with post partum endometritis  P:  1. con't IV antibiotics until 24 hours AF; WBCs normalized f/u blood cultures. 2. con't symptomatic relief with tylenol and motrin  3. Possible d/c tomorrow pending temperature in the next 24 hours. She is in agreement. All questions answered.     [facility]  5:29 PM  2019

## 2019-05-16 NOTE — PLAN OF CARE
Problem: Pain:  Goal: Pain level will decrease  Description  Pain level will decrease  Outcome: Ongoing  Note:   Patient has denied pain this shift. Tylenol and Motrin available prn. Problem: GI  Goal: No bowel complications  7/96/8981 1228 by Ean Clifford RN  Outcome: Ongoing  Note:   Passing flatus and had BM today. Poor appetite. Problem:   Goal: Adequate urinary output  5/16/2019 1228 by Ean Clifford RN  Outcome: Ongoing  Note:   No dysuria today. Problem: Discharge Planning  Intervention: Interaction with patient/family and care team  Note:   Continues on IV Zosyn every 8 hours. Lactation consultant saw patient today and left hospital pump in room. Patient has been pumping and saving milk. Care plan reviewed with patient and mother. Patient and mother verbalize understanding of the plan of care and contribute to goal setting.

## 2019-05-16 NOTE — PROGRESS NOTES
Patient did well throughout the night. Requested tylenol once during shift. Lungs clear throughout bases. Abdomen soft and non-tender. No bowel movement this shift. Passing gas. Scant amount of vaginal drainage. Patient pumping breast. Milk stored in patient refridgerator. Ambulated hallway.

## 2019-05-16 NOTE — LACTATION NOTE
Called to room by Patient to assist with pumping. Set Patient up with hospital pump. Provided Patient with larger flange size. Discussed using larger flange size with home pump. Demonstrated hands on pumping to help empty breast fully. Encouraged Patient to continue to keep pumping every three hours for 20 minutes when  from infant. Will follow up PRN.

## 2019-05-16 NOTE — PLAN OF CARE
Problem: Pain:  Goal: Pain level will decrease  Description  Pain level will decrease  5/15/2019 1847 by Blanca Block RN  Outcome: Ongoing  Note:   Pt takeing tylenol and motrin ot help with pain control and and generalized body aches  Goal: Control of acute pain  Description  Control of acute pain  Outcome: Ongoing  Note:   Pain goal:5 Patient states her pain level at a 3 out of 10 on a 0-10 pain rating scale. Patient states pain is relieved by medication. Applied blankets for comfort. Will continue to monitor. Pt. States pain is in good control with current medication. Problem: GI  Goal: No bowel complications  1/20/3839 0254 by Scarlet Veliz RN  Outcome: Ongoing  Note:   Abdomen soft. Active bowel sounds x4 quadrants. No bowel movement this shift. 5/15/2019 1847 by Blanca Block RN  Outcome: Ongoing  Note:   Pt has 1 bm today     Problem:   Goal: Adequate urinary output  5/16/2019 0254 by Scarlet Veliz RN  Outcome: Ongoing  Note:   Patient voiding adequate amounts of clear yellow urine. >30 ml/hr. I&O's monitored throughout shift.       5/15/2019 1847 by Blanca Block RN  Outcome: Ongoing  Note:   Pt voiding well, but still c/o pain with urination

## 2019-05-17 VITALS
RESPIRATION RATE: 16 BRPM | SYSTOLIC BLOOD PRESSURE: 118 MMHG | OXYGEN SATURATION: 99 % | HEART RATE: 66 BPM | WEIGHT: 133.4 LBS | DIASTOLIC BLOOD PRESSURE: 69 MMHG | BODY MASS INDEX: 24.4 KG/M2 | TEMPERATURE: 96.8 F

## 2019-05-17 LAB
ATYPICAL LYMPHOCYTES: ABNORMAL %
BASOPHILIA: ABNORMAL
BASOPHILS # BLD: 0.7 %
BASOPHILS ABSOLUTE: 0.1 THOU/MM3 (ref 0–0.1)
EOSINOPHIL # BLD: 4.3 %
EOSINOPHILS ABSOLUTE: 0.4 THOU/MM3 (ref 0–0.4)
ERYTHROCYTE [DISTWIDTH] IN BLOOD BY AUTOMATED COUNT: 14 % (ref 11.5–14.5)
ERYTHROCYTE [DISTWIDTH] IN BLOOD BY AUTOMATED COUNT: 47.8 FL (ref 35–45)
HCT VFR BLD CALC: 26.2 % (ref 37–47)
HEMOGLOBIN: 8.3 GM/DL (ref 12–16)
IMMATURE GRANS (ABS): 0.34 THOU/MM3 (ref 0–0.07)
IMMATURE GRANULOCYTES: 3.9 %
LYMPHOCYTES # BLD: 36 %
LYMPHOCYTES ABSOLUTE: 3.2 THOU/MM3 (ref 1–4.8)
MCH RBC QN AUTO: 29.7 PG (ref 26–33)
MCHC RBC AUTO-ENTMCNC: 31.7 GM/DL (ref 32.2–35.5)
MCV RBC AUTO: 93.9 FL (ref 81–99)
MONOCYTES # BLD: 13.8 %
MONOCYTES ABSOLUTE: 1.2 THOU/MM3 (ref 0.4–1.3)
NUCLEATED RED BLOOD CELLS: 0 /100 WBC
ORGANISM: ABNORMAL
PLATELET # BLD: 290 THOU/MM3 (ref 130–400)
PLATELET ESTIMATE: ADEQUATE
PMV BLD AUTO: 9 FL (ref 9.4–12.4)
RBC # BLD: 2.79 MILL/MM3 (ref 4.2–5.4)
SCAN OF BLOOD SMEAR: NORMAL
SEG NEUTROPHILS: 41.3 %
SEGMENTED NEUTROPHILS ABSOLUTE COUNT: 3.6 THOU/MM3 (ref 1.8–7.7)
URINE CULTURE, ROUTINE: ABNORMAL
WBC # BLD: 8.8 THOU/MM3 (ref 4.8–10.8)

## 2019-05-17 PROCEDURE — 6370000000 HC RX 637 (ALT 250 FOR IP): Performed by: OBSTETRICS & GYNECOLOGY

## 2019-05-17 PROCEDURE — 2580000003 HC RX 258: Performed by: OBSTETRICS & GYNECOLOGY

## 2019-05-17 PROCEDURE — 36415 COLL VENOUS BLD VENIPUNCTURE: CPT

## 2019-05-17 PROCEDURE — 6360000002 HC RX W HCPCS: Performed by: OBSTETRICS & GYNECOLOGY

## 2019-05-17 PROCEDURE — 85025 COMPLETE CBC W/AUTO DIFF WBC: CPT

## 2019-05-17 RX ORDER — AMOXICILLIN AND CLAVULANATE POTASSIUM 875; 125 MG/1; MG/1
1 TABLET, FILM COATED ORAL 2 TIMES DAILY
Qty: 14 TABLET | Refills: 0 | Status: SHIPPED | OUTPATIENT
Start: 2019-05-17 | End: 2019-05-24

## 2019-05-17 RX ADMIN — PIPERACILLIN AND TAZOBACTAM 3.38 G: 3; .375 INJECTION, POWDER, FOR SOLUTION INTRAVENOUS at 00:10

## 2019-05-17 RX ADMIN — ACETAMINOPHEN 650 MG: 325 TABLET ORAL at 08:12

## 2019-05-17 RX ADMIN — FERROUS SULFATE TAB 325 MG (65 MG ELEMENTAL FE) 325 MG: 325 (65 FE) TAB at 08:12

## 2019-05-17 RX ADMIN — Medication 10 ML: at 08:11

## 2019-05-17 RX ADMIN — DOCUSATE SODIUM 100 MG: 100 CAPSULE, LIQUID FILLED ORAL at 08:12

## 2019-05-17 RX ADMIN — PIPERACILLIN AND TAZOBACTAM 3.38 G: 3; .375 INJECTION, POWDER, FOR SOLUTION INTRAVENOUS at 08:11

## 2019-05-17 RX ADMIN — FAMOTIDINE 20 MG: 20 TABLET ORAL at 08:12

## 2019-05-17 RX ADMIN — ZOLPIDEM TARTRATE 5 MG: 5 TABLET ORAL at 00:18

## 2019-05-17 ASSESSMENT — PAIN DESCRIPTION - ONSET: ONSET: GRADUAL

## 2019-05-17 ASSESSMENT — PAIN - FUNCTIONAL ASSESSMENT: PAIN_FUNCTIONAL_ASSESSMENT: ACTIVITIES ARE NOT PREVENTED

## 2019-05-17 ASSESSMENT — PAIN DESCRIPTION - PAIN TYPE: TYPE: ACUTE PAIN

## 2019-05-17 ASSESSMENT — PAIN SCALES - GENERAL
PAINLEVEL_OUTOF10: 4
PAINLEVEL_OUTOF10: 4

## 2019-05-17 ASSESSMENT — PAIN DESCRIPTION - LOCATION: LOCATION: ABDOMEN

## 2019-05-17 ASSESSMENT — PAIN DESCRIPTION - PROGRESSION: CLINICAL_PROGRESSION: GRADUALLY IMPROVING

## 2019-05-17 ASSESSMENT — PAIN DESCRIPTION - FREQUENCY: FREQUENCY: INTERMITTENT

## 2019-05-17 ASSESSMENT — PAIN DESCRIPTION - DESCRIPTORS: DESCRIPTORS: SORE

## 2019-05-17 NOTE — PROGRESS NOTES
Patient did well throughout the night. No c/o pain. Lungs clear throughout bases. Abdomen soft and non-tender. No bowel movement this shift. Passing gas. Scant amount of vaginal drainage. Patient pumping breast. Milk stored in patient refridgerator. Ambulated hallway.

## 2019-05-17 NOTE — PLAN OF CARE
Control of acute pain  Outcome: Ongoing  Note:   Pain goal:5 Pt. Denies pain at this time. Will continue to assess needs.        Problem: GI  Goal: No bowel complications  5/54/8918 0254 by Surjit Sam RN  Outcome: Ongoing  Note:   Abdomen soft. Active bowel sounds x4 quadrants. No bowel movement this shift. Problem:   Goal: Adequate urinary output  5/17/2019 0254 by Surjit Sam RN  Outcome: Ongoing  Note:   Patient voiding adequate amounts of clear yellow urine. >30 ml/hr. I&O's monitored throughout shift.            Patient plans to be discharged home. Patient informed of and included in their plan of care.

## 2019-05-17 NOTE — DISCHARGE SUMMARY
Gynecology Discharge Summary        Pt Name: Jimmy Elam  MRN: [id number] [facility] #: [de-identified]  YOB: 2000      Reasons for Admission on 2019  1:43 PM  Pelvic pain [R10.2]  Endometritis [N71.9]    Hospital course:  20 yo  s/p  with PP hemorrhage from vaginal laceration presented c/o feeling achy and pain. She was admitted from the office for suspected endometritis. She was started on zosyn. Her symptoms improved. She was discharged home on HD#4 in good condition. Post Operative Labs    Lab Results   Component Value Date    WBC 8.8 2019    HGB 8.3 2019    HCT 26.2 2019     2019       Discharge Diagnosis   endometritis  Acute on chronic anemia    Discharge Information  Current Discharge Medication List      START taking these medications    Details   amoxicillin-clavulanate (AUGMENTIN) 875-125 MG per tablet Take 1 tablet by mouth 2 times daily for 7 days  Qty: 14 tablet, Refills: 0         CONTINUE these medications which have NOT CHANGED    Details   HYDROcodone-acetaminophen (NORCO) 5-325 MG per tablet Take 1 tablet by mouth every 6 hours as needed for Pain. ferrous sulfate 325 (65 Fe) MG tablet Take 1 tablet by mouth daily  Qty: 30 tablet, Refills: 3      docusate sodium (COLACE, DULCOLAX) 100 MG CAPS Take 100 mg by mouth 2 times daily as needed for Constipation      ibuprofen (ADVIL;MOTRIN) 600 MG tablet Take 1 tablet by mouth every 6 hours as needed for Pain  Qty: 30 tablet, Refills: 1      Prenatal Multivit-Min-Fe-FA (PRENATAL VITAMINS PO) Take by mouth daily                 Diet regular  Condition good  Discharge to:  home  Follow up in 1-2 wks.     Sally Leigh 2019 8:50 AM

## 2019-05-17 NOTE — PROGRESS NOTES
S: feeling much better than she has in a while. Denies any back or abdominal pain currently. Continues to have trouble with her right breast. Will call lactation again today. Denies any pain with voiding. O:  Vitals:    19 0759   BP: 118/69   Pulse: 66   Resp: 16   Temp: 96.8 °F (36 °C)   SpO2: 99%   last fever 100.5 5/15/19 @ 1400  t max 103 @ 1744 19    Gen: no acute distress, well appearing, ambulating in the room    Lab Results   Component Value Date    WBC 8.8 2019    HGB 8.3 (L) 2019    HCT 26.2 (L) 2019    MCV 93.9 2019     2019     Lab Results   Component Value Date    COLORU YELLOW 2019    NITRU NEGATIVE 2019    KETUA NEGATIVE 2019    UROBILINOGEN 1.0 2019    BILIRUBINUR NEGATIVE 2019     Blood cultures neg to date-final pending  Urine culture <10,0000 gram neg    A: 20 yo  HD#4 PPD#9 s/p  with PP hemorrhage for vaginal laceration with post partum endometritis  P:  1. WBCs normalized; f/u blood cultures  2. d/c home today-PO antibiotics.  F/u in 1 wk    Bony Daniel  8:47 AM  2019

## 2019-05-20 LAB
BLOOD CULTURE, ROUTINE: NORMAL
BLOOD CULTURE, ROUTINE: NORMAL

## 2019-06-13 ENCOUNTER — NURSE ONLY (OUTPATIENT)
Dept: LAB | Age: 19
End: 2019-06-13

## 2019-06-13 LAB
BASOPHILS # BLD: 1.2 %
BASOPHILS ABSOLUTE: 0.1 THOU/MM3 (ref 0–0.1)
EOSINOPHIL # BLD: 3.3 %
EOSINOPHILS ABSOLUTE: 0.2 THOU/MM3 (ref 0–0.4)
ERYTHROCYTE [DISTWIDTH] IN BLOOD BY AUTOMATED COUNT: 12.8 % (ref 11.5–14.5)
ERYTHROCYTE [DISTWIDTH] IN BLOOD BY AUTOMATED COUNT: 43 FL (ref 35–45)
HCT VFR BLD CALC: 36.8 % (ref 37–47)
HEMOGLOBIN: 11.4 GM/DL (ref 12–16)
IMMATURE GRANS (ABS): 0.02 THOU/MM3 (ref 0–0.07)
IMMATURE GRANULOCYTES: 0.3 %
LYMPHOCYTES # BLD: 48.7 %
LYMPHOCYTES ABSOLUTE: 3 THOU/MM3 (ref 1–4.8)
MCH RBC QN AUTO: 28.3 PG (ref 26–33)
MCHC RBC AUTO-ENTMCNC: 31 GM/DL (ref 32.2–35.5)
MCV RBC AUTO: 91.3 FL (ref 81–99)
MONOCYTES # BLD: 11.3 %
MONOCYTES ABSOLUTE: 0.7 THOU/MM3 (ref 0.4–1.3)
NUCLEATED RED BLOOD CELLS: 0 /100 WBC
PLATELET # BLD: 273 THOU/MM3 (ref 130–400)
PMV BLD AUTO: 9.9 FL (ref 9.4–12.4)
RBC # BLD: 4.03 MILL/MM3 (ref 4.2–5.4)
SEG NEUTROPHILS: 35.2 %
SEGMENTED NEUTROPHILS ABSOLUTE COUNT: 2.1 THOU/MM3 (ref 1.8–7.7)
TSH SERPL DL<=0.05 MIU/L-ACNC: 2.53 UIU/ML (ref 0.4–4.2)
WBC # BLD: 6.1 THOU/MM3 (ref 4.8–10.8)

## 2020-05-11 ENCOUNTER — NURSE ONLY (OUTPATIENT)
Dept: LAB | Age: 20
End: 2020-05-11

## 2020-05-11 LAB
ALBUMIN SERPL-MCNC: 4.8 G/DL (ref 3.5–5.1)
ALP BLD-CCNC: 96 U/L (ref 38–126)
ALT SERPL-CCNC: 10 U/L (ref 11–66)
AST SERPL-CCNC: 22 U/L (ref 5–40)
BILIRUB SERPL-MCNC: 0.5 MG/DL (ref 0.3–1.2)
BILIRUBIN DIRECT: < 0.2 MG/DL (ref 0–0.3)
TOTAL PROTEIN: 7.9 G/DL (ref 6.1–8)

## 2021-09-17 ENCOUNTER — NURSE ONLY (OUTPATIENT)
Dept: LAB | Age: 21
End: 2021-09-17

## 2021-09-23 LAB
CHLAMYDIA TRACHOMATIS AMPLIFIED DET: NEGATIVE
NEISSERIA GONORRHOEAE BY AMP: NEGATIVE
SPECIMEN SOURCE: NORMAL

## 2021-09-24 LAB
APTIMA MEDIA TYPE: NORMAL
T. VAGINALIS SPECIMEN SOURCE: NORMAL
TRICHOMONAS VAGINALIS BY NAA: NEGATIVE

## 2023-08-04 ENCOUNTER — NURSE ONLY (OUTPATIENT)
Dept: LAB | Age: 23
End: 2023-08-04

## 2023-08-09 LAB
C TRACH RRNA SPEC QL NAA+PROBE: NEGATIVE
N GONORRHOEA RRNA SPEC QL NAA+PROBE: NEGATIVE
SPEC CONTAINER SPEC: NORMAL
SPECIMEN SOURCE: NORMAL
SPECIMEN SOURCE: NORMAL
T VAGINALIS RRNA SPEC QL NAA+PROBE: NEGATIVE

## 2024-06-06 ENCOUNTER — NURSE ONLY (OUTPATIENT)
Dept: LAB | Age: 24
End: 2024-06-06

## 2024-06-15 LAB — CYTOLOGY THIN PREP PAP: NORMAL
